# Patient Record
Sex: FEMALE | Race: WHITE | Employment: FULL TIME | URBAN - METROPOLITAN AREA
[De-identification: names, ages, dates, MRNs, and addresses within clinical notes are randomized per-mention and may not be internally consistent; named-entity substitution may affect disease eponyms.]

---

## 2020-01-10 ENCOUNTER — OCCMED (OUTPATIENT)
Dept: URGENT CARE | Facility: CLINIC | Age: 35
End: 2020-01-10

## 2020-01-10 DIAGNOSIS — Z02.1 PHYSICAL EXAM, PRE-EMPLOYMENT: Primary | ICD-10-CM

## 2020-01-10 PROCEDURE — 86480 TB TEST CELL IMMUN MEASURE: CPT | Performed by: PHYSICIAN ASSISTANT

## 2020-01-10 PROCEDURE — 86765 RUBEOLA ANTIBODY: CPT | Performed by: PHYSICIAN ASSISTANT

## 2020-01-10 PROCEDURE — 86735 MUMPS ANTIBODY: CPT | Performed by: PHYSICIAN ASSISTANT

## 2020-01-10 PROCEDURE — 86762 RUBELLA ANTIBODY: CPT | Performed by: PHYSICIAN ASSISTANT

## 2020-01-11 LAB — RUBV IGG SERPL IA-ACNC: 158.9 IU/ML

## 2020-01-13 LAB
GAMMA INTERFERON BACKGROUND BLD IA-ACNC: 0.04 IU/ML
M TB IFN-G BLD-IMP: NEGATIVE
M TB IFN-G CD4+ BCKGRND COR BLD-ACNC: 0 IU/ML
M TB IFN-G CD4+ BCKGRND COR BLD-ACNC: 0 IU/ML
MITOGEN IGNF BCKGRD COR BLD-ACNC: >10 IU/ML

## 2020-01-14 LAB
MEV IGG SER QL: NORMAL
MUV IGG SER QL: NORMAL

## 2020-04-07 ENCOUNTER — NURSE TRIAGE (OUTPATIENT)
Dept: OTHER | Facility: OTHER | Age: 35
End: 2020-04-07

## 2020-04-07 DIAGNOSIS — Z20.828 SARS-ASSOCIATED CORONAVIRUS EXPOSURE: Primary | ICD-10-CM

## 2020-04-07 DIAGNOSIS — Z20.828 SARS-ASSOCIATED CORONAVIRUS EXPOSURE: ICD-10-CM

## 2020-04-07 PROCEDURE — 87635 SARS-COV-2 COVID-19 AMP PRB: CPT

## 2020-04-08 ENCOUNTER — OFFICE VISIT (OUTPATIENT)
Dept: URGENT CARE | Facility: CLINIC | Age: 35
End: 2020-04-08

## 2020-04-08 VITALS
DIASTOLIC BLOOD PRESSURE: 90 MMHG | WEIGHT: 205.2 LBS | BODY MASS INDEX: 34.19 KG/M2 | RESPIRATION RATE: 16 BRPM | OXYGEN SATURATION: 97 % | HEART RATE: 113 BPM | HEIGHT: 65 IN | SYSTOLIC BLOOD PRESSURE: 138 MMHG | TEMPERATURE: 97.8 F

## 2020-04-08 DIAGNOSIS — R68.89 FLU-LIKE SYMPTOMS: Primary | ICD-10-CM

## 2020-04-08 LAB
INPATIENT: NORMAL
SARS-COV-2 RNA SPEC QL NAA+PROBE: NOT DETECTED

## 2020-04-08 PROCEDURE — 87631 RESP VIRUS 3-5 TARGETS: CPT | Performed by: PHYSICIAN ASSISTANT

## 2020-04-08 PROCEDURE — G0382 LEV 3 HOSP TYPE B ED VISIT: HCPCS | Performed by: PHYSICIAN ASSISTANT

## 2020-04-09 ENCOUNTER — TELEPHONE (OUTPATIENT)
Dept: URGENT CARE | Facility: CLINIC | Age: 35
End: 2020-04-09

## 2020-04-09 LAB
FLUAV RNA NPH QL NAA+PROBE: NORMAL
FLUBV RNA NPH QL NAA+PROBE: NORMAL
RSV RNA NPH QL NAA+PROBE: NORMAL

## 2021-04-30 ENCOUNTER — IMMUNIZATIONS (OUTPATIENT)
Dept: FAMILY MEDICINE CLINIC | Facility: HOSPITAL | Age: 36
End: 2021-04-30

## 2021-04-30 DIAGNOSIS — Z23 ENCOUNTER FOR IMMUNIZATION: Primary | ICD-10-CM

## 2021-04-30 PROCEDURE — 91301 SARS-COV-2 / COVID-19 MRNA VACCINE (MODERNA) 100 MCG: CPT

## 2021-04-30 PROCEDURE — 0011A SARS-COV-2 / COVID-19 MRNA VACCINE (MODERNA) 100 MCG: CPT

## 2021-05-26 ENCOUNTER — IMMUNIZATIONS (OUTPATIENT)
Dept: FAMILY MEDICINE CLINIC | Facility: HOSPITAL | Age: 36
End: 2021-05-26

## 2021-05-26 DIAGNOSIS — Z23 ENCOUNTER FOR IMMUNIZATION: Primary | ICD-10-CM

## 2021-05-26 PROCEDURE — 0012A SARS-COV-2 / COVID-19 MRNA VACCINE (MODERNA) 100 MCG: CPT

## 2021-05-26 PROCEDURE — 91301 SARS-COV-2 / COVID-19 MRNA VACCINE (MODERNA) 100 MCG: CPT

## 2021-10-29 ENCOUNTER — NURSE TRIAGE (OUTPATIENT)
Dept: OTHER | Facility: OTHER | Age: 36
End: 2021-10-29

## 2021-10-29 DIAGNOSIS — Z20.822 SUSPECTED SEVERE ACUTE RESPIRATORY SYNDROME CORONAVIRUS 2 (SARS-COV-2) INFECTION: Primary | ICD-10-CM

## 2021-10-29 PROCEDURE — U0005 INFEC AGEN DETEC AMPLI PROBE: HCPCS | Performed by: FAMILY MEDICINE

## 2021-10-29 PROCEDURE — U0003 INFECTIOUS AGENT DETECTION BY NUCLEIC ACID (DNA OR RNA); SEVERE ACUTE RESPIRATORY SYNDROME CORONAVIRUS 2 (SARS-COV-2) (CORONAVIRUS DISEASE [COVID-19]), AMPLIFIED PROBE TECHNIQUE, MAKING USE OF HIGH THROUGHPUT TECHNOLOGIES AS DESCRIBED BY CMS-2020-01-R: HCPCS | Performed by: FAMILY MEDICINE

## 2021-10-30 LAB — SARS-COV-2 RNA RESP QL NAA+PROBE: NEGATIVE

## 2021-11-02 ENCOUNTER — TELEPHONE (OUTPATIENT)
Dept: INTERNAL MEDICINE CLINIC | Facility: CLINIC | Age: 36
End: 2021-11-02

## 2023-05-16 NOTE — PROGRESS NOTES
Depression Screening and Follow-up Plan: Patient was screened for depression during today's encounter  They screened negative with a PHQ-2 score of 0  Assessment/Plan:    No problem-specific Assessment & Plan notes found for this encounter  Diagnoses and all orders for this visit:    Annual physical exam    Migraine without aura and with status migrainosus, not intractable    Gastroesophageal reflux disease with esophagitis without hemorrhage    Screening for cervical cancer  -     Ambulatory referral to Obstetrics / Gynecology; Future    Screening for diabetes mellitus  -     Hemoglobin A1C; Future    Screening for cardiovascular condition  -     Lipid panel; Future  -     Comprehensive metabolic panel; Future  -     Lipid panel; Future    Need for hepatitis C screening test  -     Hepatitis C antibody; Future    Irritable bowel syndrome with diarrhea  -     Ambulatory Referral to Gastroenterology; Future    Anxiety  -     CBC and Platelet; Future  -     TSH, 3rd generation with Free T4 reflex; Future  -     Ambulatory Referral to [facility]; Future  -     vortioxetine (TRINTELLIX) 10 MG tablet; Take 1 tablet (10 mg total) by mouth daily    Carpal tunnel syndrome of right wrist  -     EMG 2 Limb Upper Extremity; Future    Other orders  -     melatonin 3 mg; Take 3 mg by mouth daily at bedtime  -     diphenhydrAMINE HCl, Sleep, 50 MG CAPS; Take 50 mg by mouth          Subjective:      Patient ID: Marie Acharya is a 40 y o  female      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  Mammogram  2 x  Lumps=ok  Mom breast cancer 52's  No genetic markers    G3= 15 yo     Son 8 and 8  Single mom    cts  R>l years    ibs    Anxiety--ativan 5 years ago  lexapro and effexor ill tollerated  [place]  Passion flower   Hemp  vallerian root  No colon ca hx      The following portions of the patient's history were reviewed and updated as appropriate: allergies, current medications, past family history, past medical history, past social history, past surgical history, and problem list     Review of Systems   Constitutional: Negative for activity change, appetite change, chills, diaphoresis, fatigue and fever  HENT: Negative for congestion, facial swelling, hearing loss, mouth sores, rhinorrhea, sore throat, trouble swallowing and voice change  Eyes: Negative for photophobia and pain  Respiratory: Negative for apnea, cough, chest tightness, shortness of breath and stridor  Cardiovascular: Negative for chest pain, palpitations and leg swelling  Gastrointestinal: Negative for abdominal distention, abdominal pain, blood in stool and constipation  Endocrine: Negative for cold intolerance and heat intolerance  Genitourinary: Negative for difficulty urinating, dysuria, flank pain, genital sores, hematuria and urgency  Musculoskeletal: Negative for arthralgias, back pain, gait problem, joint swelling and myalgias  Skin: Negative for rash and wound  Allergic/Immunologic: Negative for environmental allergies, food allergies and immunocompromised state  Neurological: Negative for dizziness, tremors, seizures, syncope, facial asymmetry, speech difficulty, weakness, light-headedness, numbness and headaches  Hematological: Negative for adenopathy  Does not bruise/bleed easily  Psychiatric/Behavioral: Negative for agitation, behavioral problems, hallucinations, self-injury, sleep disturbance and suicidal ideas  Objective:      /70   Pulse 74   Temp (!) 97 4 °F (36 3 °C)   Resp 12   SpO2 98%          Physical Exam  Vitals and nursing note reviewed  Constitutional:       General: She is not in acute distress  Appearance: Normal appearance  She is not ill-appearing  HENT:      Head: Normocephalic  Right Ear: External ear normal  There is no impacted cerumen  Left Ear: External ear normal  There is no impacted cerumen  Nose: No congestion or rhinorrhea        Mouth/Throat: Pharynx: No posterior oropharyngeal erythema  Eyes:      General: No scleral icterus  Right eye: No discharge  Left eye: No discharge  Neck:      Vascular: No carotid bruit  Cardiovascular:      Rate and Rhythm: Normal rate and regular rhythm  Heart sounds: Normal heart sounds  No murmur heard  No friction rub  No gallop  Pulmonary:      Breath sounds: No wheezing or rhonchi  Abdominal:      General: There is no distension  Tenderness: There is no abdominal tenderness  There is no guarding  Musculoskeletal:         General: No swelling  Cervical back: No rigidity  Right lower leg: No edema  Left lower leg: No edema  Lymphadenopathy:      Cervical: No cervical adenopathy  Skin:     Coloration: Skin is not jaundiced  Neurological:      Mental Status: She is alert  Cranial Nerves: No cranial nerve deficit  Motor: No weakness        Coordination: Coordination normal    Psychiatric:         Mood and Affect: Mood normal

## 2023-05-17 ENCOUNTER — OFFICE VISIT (OUTPATIENT)
Dept: INTERNAL MEDICINE CLINIC | Facility: CLINIC | Age: 38
End: 2023-05-17

## 2023-05-17 VITALS
HEART RATE: 74 BPM | TEMPERATURE: 97.4 F | RESPIRATION RATE: 12 BRPM | SYSTOLIC BLOOD PRESSURE: 130 MMHG | DIASTOLIC BLOOD PRESSURE: 70 MMHG | OXYGEN SATURATION: 98 %

## 2023-05-17 DIAGNOSIS — Z13.6 SCREENING FOR CARDIOVASCULAR CONDITION: ICD-10-CM

## 2023-05-17 DIAGNOSIS — G56.01 CARPAL TUNNEL SYNDROME OF RIGHT WRIST: ICD-10-CM

## 2023-05-17 DIAGNOSIS — Z12.4 SCREENING FOR CERVICAL CANCER: ICD-10-CM

## 2023-05-17 DIAGNOSIS — Z11.59 NEED FOR HEPATITIS C SCREENING TEST: ICD-10-CM

## 2023-05-17 DIAGNOSIS — G43.001 MIGRAINE WITHOUT AURA AND WITH STATUS MIGRAINOSUS, NOT INTRACTABLE: ICD-10-CM

## 2023-05-17 DIAGNOSIS — F41.9 ANXIETY: ICD-10-CM

## 2023-05-17 DIAGNOSIS — Z00.00 ANNUAL PHYSICAL EXAM: Primary | ICD-10-CM

## 2023-05-17 DIAGNOSIS — K58.0 IRRITABLE BOWEL SYNDROME WITH DIARRHEA: ICD-10-CM

## 2023-05-17 DIAGNOSIS — K21.00 GASTROESOPHAGEAL REFLUX DISEASE WITH ESOPHAGITIS WITHOUT HEMORRHAGE: ICD-10-CM

## 2023-05-17 DIAGNOSIS — Z13.1 SCREENING FOR DIABETES MELLITUS: ICD-10-CM

## 2023-05-17 RX ORDER — LANOLIN ALCOHOL/MO/W.PET/CERES
3 CREAM (GRAM) TOPICAL
COMMUNITY

## 2023-06-05 ENCOUNTER — OFFICE VISIT (OUTPATIENT)
Dept: OBGYN CLINIC | Facility: CLINIC | Age: 38
End: 2023-06-05
Payer: COMMERCIAL

## 2023-06-05 VITALS
WEIGHT: 190.8 LBS | SYSTOLIC BLOOD PRESSURE: 120 MMHG | HEIGHT: 64 IN | BODY MASS INDEX: 32.58 KG/M2 | DIASTOLIC BLOOD PRESSURE: 84 MMHG

## 2023-06-05 DIAGNOSIS — Z12.4 SCREENING FOR CERVICAL CANCER: ICD-10-CM

## 2023-06-05 DIAGNOSIS — Z01.419 ENCNTR FOR GYN EXAM (GENERAL) (ROUTINE) W/O ABN FINDINGS: Primary | ICD-10-CM

## 2023-06-05 PROCEDURE — G0476 HPV COMBO ASSAY CA SCREEN: HCPCS | Performed by: OBSTETRICS & GYNECOLOGY

## 2023-06-05 PROCEDURE — G0145 SCR C/V CYTO,THINLAYER,RESCR: HCPCS | Performed by: OBSTETRICS & GYNECOLOGY

## 2023-06-05 PROCEDURE — S0610 ANNUAL GYNECOLOGICAL EXAMINA: HCPCS | Performed by: OBSTETRICS & GYNECOLOGY

## 2023-06-05 NOTE — PROGRESS NOTES
Annual Wellness Visit  [address], [address], [facility], [address] [facility]    ASSESSMENT/PLAN: Trang Damian is a 40 y o  [id number] who presents for annual gynecologic exam     Encounter for routine gynecologic examination  - Routine well woman exam completed today  - Cervical Cancer Screening: Current ASCCP Guidelines reviewed  Last Pap: Not on file  Next Pap Due: today, routine   - HPV Vaccination status: Immunization series complete  - STI screening offered including HIV testing: offered, pt declined  - Contraceptive counseling discussed  Current contraception: abstinence  - The following were reviewed in today's visit: breast self exam    Additional problems addressed during this visit:  1  Encntr for gyn exam (general) (routine) w/o abn findings  -     Liquid-based pap, screening    2  Screening for cervical cancer  -     Ambulatory referral to Obstetrics / Gynecology  -     Liquid-based pap, screening        Next visit: 1 yr    CC:  Annual Gynecologic Examination    HPI: Trang Damian is a 40 y o  [id number] who presents for annual gynecologic examination  New patient presents for Gyn exam   Previous Gyn exam 8 years ago  Patient with IBS, colonoscopy scheduled with G I       Gyn History  Patient's last menstrual period was 2023 (exact date)  Last Pap: Not on file was normal    She  reports that she is not currently sexually active         OB History  [id number]    Past Medical History:  No date: GERD (gastroesophageal reflux disease)  No date: Migraines     Past Surgical History:  No date:  SECTION  No date: SINUS SURGERY  No date: TONSILLECTOMY     Family History   Problem Relation Age of Onset   • Breast cancer Mother [address] [place]   • Diabetes Father    • Hypertension Father         Social History     Tobacco Use   • Smoking status: Never   • Smokeless tobacco: Never   Vaping Use   • Vaping Use: Never used   Substance Use Topics   • Alcohol use: Yes     Comment: social "  • Drug use: Never          Current Outpatient Medications:   •  diphenhydrAMINE HCl, Sleep, 50 MG CAPS, Take 50 mg by mouth, Disp: , Rfl:   •  melatonin 3 mg, Take 3 mg by mouth daily at bedtime, Disp: , Rfl:   •  PARoxetine (PAXIL-CR) 12 5 mg 24 hr tablet, Take 1 tablet (12 5 mg total) by mouth every morning, Disp: 30 tablet, Rfl: 2    She is allergic to dust mite extract       ROS negative except as noted in HPI    Objective:  /84 (BP Location: Right arm, Patient Position: Sitting, Cuff Size: Standard)   Ht 5' 4\" (1 626 m)   Wt 86 5 kg (190 lb 12 8 oz)   LMP 05/22/2023 (Exact Date)   BMI 32 75 kg/m²      Physical Exam  Vitals and nursing note reviewed  HENT:      Head: Normocephalic  Chest:   Breasts:     Breasts are symmetrical       Right: Normal  No bleeding, mass, nipple discharge, skin change or tenderness  Left: Normal  No bleeding, mass, nipple discharge, skin change or tenderness  Abdominal:      General: There is no distension  Palpations: Abdomen is soft  There is no mass  Tenderness: There is no abdominal tenderness  There is no rebound  Genitourinary:     General: Normal vulva  Exam position: Lithotomy position  Labia:         Right: No rash, tenderness or lesion  Left: No rash, tenderness or lesion  Urethra: No urethral pain or urethral lesion  Vagina: Normal  No vaginal discharge  Cervix: No discharge, friability, lesion or erythema  Uterus: Normal        Adnexa: Right adnexa normal and left adnexa normal       Rectum: No external hemorrhoid  Musculoskeletal:      Right lower leg: No edema  Left lower leg: No edema  Lymphadenopathy:      Upper Body:      Right upper body: No axillary or pectoral adenopathy  Left upper body: No axillary or pectoral adenopathy  Skin:     General: Skin is warm  Neurological:      Mental Status: She is alert and oriented to person, place, and time     Psychiatric:         Mood and " Affect: Mood normal          Behavior: Behavior normal          Thought Content:  Thought content normal

## 2023-06-07 LAB
HPV HR 12 DNA CVX QL NAA+PROBE: NEGATIVE
HPV16 DNA CVX QL NAA+PROBE: NEGATIVE
HPV18 DNA CVX QL NAA+PROBE: NEGATIVE

## 2023-06-12 LAB
LAB AP GYN PRIMARY INTERPRETATION: NORMAL
Lab: NORMAL

## 2023-07-16 PROBLEM — Z11.59 NEED FOR HEPATITIS C SCREENING TEST: Status: RESOLVED | Noted: 2023-05-17 | Resolved: 2023-07-16

## 2023-07-16 PROBLEM — Z12.4 SCREENING FOR CERVICAL CANCER: Status: RESOLVED | Noted: 2023-05-17 | Resolved: 2023-07-16

## 2023-07-16 PROBLEM — Z13.6 SCREENING FOR CARDIOVASCULAR CONDITION: Status: RESOLVED | Noted: 2023-05-17 | Resolved: 2023-07-16

## 2023-07-17 ENCOUNTER — SOCIAL WORK (OUTPATIENT)
Dept: BEHAVIORAL/MENTAL HEALTH CLINIC | Facility: CLINIC | Age: 38
End: 2023-07-17
Payer: COMMERCIAL

## 2023-07-17 DIAGNOSIS — F42.2 MIXED OBSESSIONAL THOUGHTS AND ACTS: ICD-10-CM

## 2023-07-17 DIAGNOSIS — F43.12 POST-TRAUMATIC STRESS DISORDER, CHRONIC: Primary | ICD-10-CM

## 2023-07-17 PROCEDURE — 90791 PSYCH DIAGNOSTIC EVALUATION: CPT | Performed by: SOCIAL WORKER
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
## 2023-07-31 ENCOUNTER — OFFICE VISIT (OUTPATIENT)
Dept: INTERNAL MEDICINE CLINIC | Facility: CLINIC | Age: 38
End: 2023-07-31
Payer: COMMERCIAL

## 2023-07-31 VITALS
BODY MASS INDEX: 32.78 KG/M2 | HEIGHT: 64 IN | RESPIRATION RATE: 12 BRPM | TEMPERATURE: 97.8 F | HEART RATE: 74 BPM | DIASTOLIC BLOOD PRESSURE: 70 MMHG | OXYGEN SATURATION: 97 % | SYSTOLIC BLOOD PRESSURE: 130 MMHG | WEIGHT: 192 LBS

## 2023-07-31 DIAGNOSIS — F41.9 ANXIETY: Primary | ICD-10-CM

## 2023-07-31 PROCEDURE — 99213 OFFICE O/P EST LOW 20 MIN: CPT | Performed by: INTERNAL MEDICINE

## 2023-07-31 RX ORDER — PAROXETINE HYDROCHLORIDE HEMIHYDRATE 25 MG/1
25 TABLET, FILM COATED, EXTENDED RELEASE ORAL EVERY MORNING
Qty: 30 TABLET | Refills: 4 | Status: SHIPPED | OUTPATIENT
Start: 2023-07-31 | End: 2023-08-30

## 2023-07-31 NOTE — PROGRESS NOTES
BMI Counseling: There is no height or weight on file to calculate BMI. The BMI is above normal. Nutrition recommendations include decreasing portion sizes. Exercise recommendations include exercising 3-5 times per week. Patient referred to PCP. Rationale for BMI follow-up plan is due to patient being overweight or obese. Assessment/Plan:    No problem-specific Assessment & Plan notes found for this encounter. Diagnoses and all orders for this visit:    Anxiety  -     PARoxetine (PAXIL-CR) 25 mg 24 hr tablet; Take 1 tablet (25 mg total) by mouth every morning          Subjective:      Patient ID: Gwen Calzada is a 40 y.o. female. Labs 5/2023=pending-rerecommend  Gyn pap 6/2023 7/17/23=behavior therapist=works well  paxil helps  Thinks can increas  Also pmdd  Labs and gi help      The following portions of the patient's history were reviewed and updated as appropriate: allergies, current medications, past family history, past medical history, past social history, past surgical history, and problem list.    Review of Systems   Constitutional: Negative for activity change, appetite change, chills, diaphoresis, fatigue and fever. HENT: Negative for congestion, facial swelling, hearing loss, mouth sores, rhinorrhea, sore throat, trouble swallowing and voice change. Eyes: Negative for photophobia and pain. Respiratory: Negative for apnea, cough, chest tightness, shortness of breath and stridor. Cardiovascular: Negative for chest pain, palpitations and leg swelling. Gastrointestinal: Negative for abdominal distention, abdominal pain, blood in stool and constipation. Endocrine: Negative for cold intolerance and heat intolerance. Genitourinary: Negative for difficulty urinating, dysuria, flank pain, genital sores, hematuria and urgency. Musculoskeletal: Negative for arthralgias, back pain, gait problem, joint swelling and myalgias. Skin: Negative for rash and wound. Allergic/Immunologic: Negative for environmental allergies, food allergies and immunocompromised state. Neurological: Negative for dizziness, tremors, seizures, syncope, facial asymmetry, speech difficulty, weakness, light-headedness, numbness and headaches. Hematological: Negative for adenopathy. Does not bruise/bleed easily. Psychiatric/Behavioral: Negative for agitation, behavioral problems, hallucinations, self-injury, sleep disturbance and suicidal ideas. Objective:      /70   Pulse 74   Temp 97.8 °F (36.6 °C)   Resp 12   Ht 5' 4" (1.626 m)   Wt 87.1 kg (192 lb)   SpO2 97%   BMI 32.96 kg/m²          Physical Exam  Vitals and nursing note reviewed. Constitutional:       General: She is not in acute distress. Appearance: Normal appearance. She is not ill-appearing. HENT:      Head: Normocephalic. Right Ear: External ear normal. There is no impacted cerumen. Left Ear: External ear normal. There is no impacted cerumen. Nose: No congestion or rhinorrhea. Mouth/Throat:      Pharynx: No posterior oropharyngeal erythema. Eyes:      General: No scleral icterus. Right eye: No discharge. Left eye: No discharge. Neck:      Vascular: No carotid bruit. Cardiovascular:      Rate and Rhythm: Normal rate and regular rhythm. Heart sounds: Normal heart sounds. No murmur heard. No friction rub. No gallop. Pulmonary:      Breath sounds: No wheezing or rhonchi. Abdominal:      General: There is no distension. Tenderness: There is no abdominal tenderness. There is no guarding. Musculoskeletal:         General: No swelling. Cervical back: No rigidity. Right lower leg: No edema. Left lower leg: No edema. Lymphadenopathy:      Cervical: No cervical adenopathy. Skin:     Coloration: Skin is not jaundiced. Neurological:      Mental Status: She is alert. Cranial Nerves: No cranial nerve deficit.       Motor: No weakness.       Coordination: Coordination normal.   Psychiatric:         Mood and Affect: Mood normal.

## 2023-10-02 ENCOUNTER — TELEPHONE (OUTPATIENT)
Dept: BEHAVIORAL/MENTAL HEALTH CLINIC | Facility: CLINIC | Age: 38
End: 2023-10-02

## 2023-10-17 ENCOUNTER — TELEPHONE (OUTPATIENT)
Dept: BEHAVIORAL/MENTAL HEALTH CLINIC | Facility: CLINIC | Age: 38
End: 2023-10-17

## 2023-10-20 ENCOUNTER — TELEPHONE (OUTPATIENT)
Dept: BEHAVIORAL/MENTAL HEALTH CLINIC | Facility: CLINIC | Age: 38
End: 2023-10-20

## 2023-11-14 ENCOUNTER — TELEMEDICINE (OUTPATIENT)
Dept: BEHAVIORAL/MENTAL HEALTH CLINIC | Facility: CLINIC | Age: 38
End: 2023-11-14
Payer: COMMERCIAL

## 2023-11-14 DIAGNOSIS — F43.12 POST-TRAUMATIC STRESS DISORDER, CHRONIC: Primary | ICD-10-CM

## 2023-11-14 DIAGNOSIS — F42.2 MIXED OBSESSIONAL THOUGHTS AND ACTS: ICD-10-CM

## 2023-11-14 PROCEDURE — 90837 PSYTX W PT 60 MINUTES: CPT | Performed by: SOCIAL WORKER

## 2023-11-14 NOTE — PSYCH
Virtual Regular Visit    Verification of patient location:    Patient is located at Home in the following state in which I hold an active license [place]      Assessment/Plan:    Problem List Items Addressed This Visit    None  Visit Diagnoses       Post-traumatic stress disorder, chronic    -  Primary    Mixed obsessional thoughts and acts                Goals addressed in session: Goal 1 and Goal 2          Reason for visit is   Chief Complaint   Patient presents with    Virtual Regular Visit          Encounter provider Britta Pineda    Provider located at [address] [place] [address]  [phone]      Recent Visits  No visits were found meeting these conditions. Showing recent visits within past 7 days and meeting all other requirements  Today's Visits  Date Type Provider Dept   [address] Therapist [facility]   Showing today's visits and meeting all other requirements  Future Appointments  No visits were found meeting these conditions. Showing future appointments within next 150 days and meeting all other requirements       The patient was identified by name and date of birth. Marjorie Finley was informed that this is a telemedicine visit and [facility] She agrees to proceed. .  My office door was closed. No one else was in the room. She acknowledged consent and understanding of privacy and security of the video platform. The patient has agreed to participate and understands they can discontinue the visit at any time. Patient is aware this is a billable service. Subjective  Marjorie Finley is a 40 y.o. female  .       HPI     Past Medical History:   Diagnosis Date    GERD (gastroesophageal reflux disease)     Migraines        Past Surgical History:   Procedure Laterality Date     SECTION      SINUS SURGERY      TONSILLECTOMY         Current Outpatient Medications   Medication Sig Dispense Refill    diphenhydrAMINE HCl, Sleep, 50 MG CAPS Take 50 mg by mouth      melatonin 3 mg Take 3 mg by mouth daily at bedtime      PARoxetine (PAXIL-CR) 25 mg 24 hr tablet Take 1 tablet (25 mg total) by mouth every morning 30 tablet 4     No current facility-administered medications for this visit. Allergies   Allergen Reactions    Dust Mite Extract Hives       Review of Systems    Video Exam    There were no vitals filed for this visit. Physical Exam     Behavioral Health Psychotherapy Progress Note    Psychotherapy Provided: Individual Psychotherapy     1. Post-traumatic stress disorder, chronic        2. Mixed obsessional thoughts and acts            Goals addressed in session: Goal 1 and Goal 2     DATA: Therapist met with Opal Foreman. Therapist and client discussed recent events that had caused increased distress and anxiety. She shared these stressors and worries that she has related to work and her current plan at work to rectify these stressors. Therapist discussed treatment planning and what they could do in the coming weeks to assist in alleviating symptoms. During this session, this clinician used the following therapeutic modalities: Client-centered Therapy, Cognitive Behavioral Therapy, Cognitive Processing Therapy, Mindfulness-based Strategies, and Supportive Psychotherapy    Substance Abuse was not addressed during this session. If the client is diagnosed with a co-occurring substance use disorder, please indicate any changes in the frequency or amount of use: na. Stage of change for addressing substance use diagnoses: No substance use/Not applicable    ASSESSMENT:  Claudeen Decree presents with a Euthymic/ normal mood. her affect is Normal range and intensity, which is congruent, with her mood and the content of the session. The client has made progress on their goals.      Claudeen Decree presents with a minimal risk of suicide, minimal risk of self-harm, and minimal risk of harm to others. For any risk assessment that surpasses a "low" rating, a safety plan must be developed. A safety plan was indicated: no  If yes, describe in detail na    PLAN: Between sessions, Jesi Salguero will utilize learned skills. At the next session, the therapist will use Supportive Psychotherapy to address anxiety. Behavioral Health Treatment Plan and Discharge Planning: Jesi Salguero is aware of and agrees to continue to work on their treatment plan. They have identified and are working toward their discharge goals.  yes    Visit start and stop times:    11/14/23  Start Time: 0800  Stop Time: 0900  Total Visit Time: 60 minutes

## 2023-11-14 NOTE — BH CRISIS PLAN
Client Name: Marla Soler       Client YOB: 1985  : 1985    Treatment Team (include name and contact information):     Psychotherapist: Monik Mtz    Psychiatrist: Dr. Shakeel Madden of information completed: no    " na   Release of information completed: no    Other (Specify Role): na    Release of information completed: no    Other (Specify Role): na   Release of information completed: no    Healthcare Provider  Itz Weaver DO  [address] [facility]  [phone]    Type of Plan   * Child plans (children 15 yo and younger) must be completed and signed by the child's legal guardian   * Plans for all individuals 15 yo and above must be signed by the client. 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, good nurse, open"  The stressors and triggers that may put me at risk are:  chronic anxiety, divorce, and job stress    Coping skills I can use to keep myself calm and safe: Take a shower, Listen to music, Physical activity, [place]/meditate, Journal, and Increased contact with professional supports    Coping skills/supports I can use to maintain abstinence from substance use:   Not Applicable    The people that provide me with help and support: (Include name, contact, and how they can help)   Support person #1: [address]    * Phone number: in cell phone    * How can they help me? Explore her feelings   Support person #2:Corrie    * Phone number: in cell phone    * How can they help me?  Schedule an appointment       In the past, the following has helped me in times of crisis:    Being in a quiet space, Being with other people, Taking medications, Talking to a professional on the telephone, Calling a friend, Taking a walk or exercising, Breathing exercises (or other mindfulness-based activities), Praying or meditating, Using de-escalation tools that I have learned, Listening to music, and Watching television or a movie      If it is an emergency and you need immediate help, call 9-1-1    If there is a possibility of danger to yourself or others, call the following crisis hotline resources:     Adult Crisis Numbers  Suicide Prevention Hotline - Dial 9-8-8  [facility]: [address] [facility] [address] [facility] [address]: [address] [facility] [address]: [address]: [phone]  [address] [place] [address] Street: [address]: [address]: [address]: [phone] (daytime). [phone] (after hours, weekends, holidays)     Child/Adolescent Crisis Numbers   [facility]: [address]: [phone]   [place]: [phone]   [address] [place] [address] Street: [phone]    Please note: Some [facility] do not have a separate number for Child/Adolescent specific crisis. If your county is not listed under Child/Adolescent, please call the adult number for your county     National Talk to Text Line   All Ages - 911-426    In the event your feelings become unmanageable, and you cannot reach your support system, you will call 911 immediately or go to the nearest hospital emergency room.

## 2023-11-14 NOTE — BH TREATMENT PLAN
Nanci  1985     Date of Initial Psychotherapy Assessment: 11/14/23   Date of Current Treatment Plan: 11/14/23  Treatment Plan Target Date: 5/14/24  Treatment Plan Expiration Date: 5/14/24    Diagnosis:   1. Post-traumatic stress disorder, chronic        2. Mixed obsessional thoughts and acts            Area(s) of Need: support, understanding, kindness    Long Term Goal 1 (in the client's own words): Reduce intrusive thoughts (anxiety)    Stage of Change: Preparation    Target Date for completion: 5/24     Anticipated therapeutic modalities: CBT, Supportive Counseling, Mindfulness, Medication Management, [place] Setting, Strengths Based     People identified to complete this goal: Dante Edmonds, Psychiatry      Objective 1: (identify the means of measuring success in meeting the objective): I will be able to learn at least 5 new coping skills to reduce symptoms of anxiety by at least 50%. Objective 2: (identify the means of measuring success in meeting the objective): I will comply with medication management as agreed upon at least 75% of the time following meeting with my psychiatrist.       Tran Sharpe Term Goal 2 (in the client's own words): Establish and maintain healthy boundaries and improve self care    Stage of Change: Preparation    Target Date for completion: 5/24     Anticipated therapeutic modalities: CBT, Communication, Medication Management, Supportive Counseling      People identified to complete this goal: Dante Edmonds, Psychiatry      Objective 1: (identify the means of measuring success in meeting the objective): I will be able to set and implement at least 5 boundaries with others, as I see necessary, and maintain them at least 50% of the time. Objective 2: (identify the means of measuring success in meeting the objective):  I will be able to engage in at least 3 activities that promote self care each month, as self reported. I am currently under the care of a [facility] psychiatric provider: yes    My [facility] psychiatric provider is: Dr. Kenny Garrison    I am currently taking psychiatric medications: Yes, as prescribed    I feel that I will be ready for discharge from mental health care when I reach the following (measurable goal/objective): I am able to feel confident and stable within my own environment at home and at work, at least 80% of the time, and am able to seek support from appropritate resources when I am feeling stressed and in need of support at least 80% of the time. For children and adults who have a legal guardian:   Has there been any change to custody orders and/or guardianship status? No. If yes, attach updated documentation. I have created my Crisis Plan and have been offered a copy of this plan    [facility] [facility]: Diagnosis and Treatment Plan explained to [employer] acknowledges an understanding of their diagnosis. Marla Soler agrees to this treatment plan. I have been offered a copy of this Treatment Plan. Yes    Marla Soler, 1985, actively participated in the creation of this treatment plan during a virtual session, using the [address] [place] [address]. Marla Soler  provided verbal consent on 11/14/2023 at 8:00 AM. The treatment plan was transcribed into the [address] [place] [address] Real Record at a later time.

## 2023-11-22 ENCOUNTER — TELEMEDICINE (OUTPATIENT)
Dept: BEHAVIORAL/MENTAL HEALTH CLINIC | Facility: CLINIC | Age: 38
End: 2023-11-22
Payer: COMMERCIAL

## 2023-11-22 DIAGNOSIS — F43.12 POST-TRAUMATIC STRESS DISORDER, CHRONIC: Primary | ICD-10-CM

## 2023-11-22 DIAGNOSIS — F42.2 MIXED OBSESSIONAL THOUGHTS AND ACTS: ICD-10-CM

## 2023-11-22 PROCEDURE — 90834 PSYTX W PT 45 MINUTES: CPT | Performed by: SOCIAL WORKER

## 2023-11-22 NOTE — PSYCH
Virtual Regular Visit    Verification of patient location:    Patient is located at Home in the following state in which I hold an active license [place]      Assessment/Plan:    Problem List Items Addressed This Visit    None  Visit Diagnoses       Post-traumatic stress disorder, chronic    -  Primary    Mixed obsessional thoughts and acts                Goals addressed in session: Goal 1 and Goal 2          Reason for visit is   Chief Complaint   Patient presents with    Virtual Regular Visit          Encounter provider Monik Mtz    Provider located at [address] [place] [address]  [phone]      Recent Visits  No visits were found meeting these conditions. Showing recent visits within past 7 days and meeting all other requirements  Today's Visits  Date Type Provider Dept   [address] Therapist [facility]   Showing today's visits and meeting all other requirements  Future Appointments  No visits were found meeting these conditions. Showing future appointments within next 150 days and meeting all other requirements       The patient was identified by name and date of birth. Marla Soler was informed that this is a telemedicine visit and [facility] She agrees to proceed. .  My office door was closed. No one else was in the room. She acknowledged consent and understanding of privacy and security of the video platform. The patient has agreed to participate and understands they can discontinue the visit at any time. Patient is aware this is a billable service. Subjective  Marla Soler is a 40 y.o. female  .       HPI     Past Medical History:   Diagnosis Date    GERD (gastroesophageal reflux disease)     Migraines        Past Surgical History:   Procedure Laterality Date     SECTION      SINUS SURGERY      TONSILLECTOMY         Current Outpatient Medications   Medication Sig Dispense Refill    diphenhydrAMINE HCl, Sleep, 50 MG CAPS Take 50 mg by mouth      melatonin 3 mg Take 3 mg by mouth daily at bedtime      PARoxetine (PAXIL-CR) 25 mg 24 hr tablet Take 1 tablet (25 mg total) by mouth every morning 30 tablet 4     No current facility-administered medications for this visit. Allergies   Allergen Reactions    Dust Mite Extract Hives       Review of Systems    Video Exam    There were no vitals filed for this visit. Physical Exam     Behavioral Health Psychotherapy Progress Note    Psychotherapy Provided: Individual Psychotherapy     1. Post-traumatic stress disorder, chronic        2. Mixed obsessional thoughts and acts            Goals addressed in session: Goal 1 and Goal 2     DATA: Therapist met with Sol Molina. Therapist and client discussed her ability to implement time for herself. She has been able to implement life changes and shared that things have improved with the use of time devoted to herself. She noted significant change when implementing exercise into her daily routine. Therapist continues to encouraged this to the best of her ability, but not to become angry with herself when she is unable. Therapist and client also discussed alternative methods of focusing on herself and including the children in supporting her in chores in order to alleviate some burden of household responsibility. Therapist will continue to explore these outcomes during upcoming sessions, as ongoing therapy remains necessary at this time. During this session, this clinician used the following therapeutic modalities: Client-centered Therapy, Cognitive Behavioral Therapy, Mindfulness-based Strategies, and Supportive Psychotherapy    Substance Abuse was not addressed during this session.  If the client is diagnosed with a co-occurring substance use disorder, please indicate any changes in the frequency or amount of use: na. Stage of change for addressing substance use diagnoses: No substance use/Not applicable    ASSESSMENT:  Telma Salinas presents with a Euthymic/ normal mood. her affect is Normal range and intensity, which is congruent, with her mood and the content of the session. The client has made progress on their goals. Telma Salinas presents with a minimal risk of suicide, minimal risk of self-harm, and minimal risk of harm to others. For any risk assessment that surpasses a "low" rating, a safety plan must be developed. A safety plan was indicated: no  If yes, describe in detail na    PLAN: Between sessions, Telma Salinas will continue to prioritize some time for herself. At the next session, the therapist will use Supportive Psychotherapy to address anxiety. Behavioral Health Treatment Plan and Discharge Planning: Telma Salinas is aware of and agrees to continue to work on their treatment plan. They have identified and are working toward their discharge goals.  yes    Visit start and stop times:    11/22/23  Start Time: 0901  Stop Time: 0945  Total Visit Time: 44 minutes

## 2023-11-28 ENCOUNTER — TELEMEDICINE (OUTPATIENT)
Dept: BEHAVIORAL/MENTAL HEALTH CLINIC | Facility: CLINIC | Age: 38
End: 2023-11-28
Payer: COMMERCIAL

## 2023-11-28 DIAGNOSIS — F42.2 MIXED OBSESSIONAL THOUGHTS AND ACTS: ICD-10-CM

## 2023-11-28 DIAGNOSIS — F43.12 POST-TRAUMATIC STRESS DISORDER, CHRONIC: Primary | ICD-10-CM

## 2023-11-28 PROCEDURE — 90832 PSYTX W PT 30 MINUTES: CPT | Performed by: SOCIAL WORKER

## 2023-11-28 NOTE — PSYCH
Anesthesia Post Evaluation    Patient: Ciarra Smith    Procedure(s) Performed: Procedure(s) (LRB):  INSERTION, NEUROSTIMULATOR, SPINAL CORD, DORSAL COLUMN (N/A)    Final Anesthesia Type: MAC      Patient location during evaluation: PACU  Patient participation: Yes- Able to Participate  Level of consciousness: awake and alert  Post-procedure vital signs: reviewed and stable  Pain management: adequate  Airway patency: patent    PONV status at discharge: No PONV  Anesthetic complications: no      Cardiovascular status: blood pressure returned to baseline  Respiratory status: unassisted and room air  Hydration status: euvolemic  Follow-up not needed.          Vitals Value Taken Time   /75 04/14/23 1118   Temp 36.7 °C (98 °F) 04/14/23 0944   Pulse 79 04/14/23 1118   Resp 12 04/14/23 1118   SpO2 100 % 04/14/23 1118         Event Time   Out of Recovery 10:04:20         Pain/Jatin Score: No data recorded       Virtual Regular Visit    Verification of patient location:    Patient is located at Home in the following state in which I hold an active license [place]      Assessment/Plan:    Problem List Items Addressed This Visit    None  Visit Diagnoses       Post-traumatic stress disorder, chronic    -  Primary    Mixed obsessional thoughts and acts                Goals addressed in session: Goal 1 and Goal 2          Reason for visit is   Chief Complaint   Patient presents with    Virtual Regular Visit          Encounter provider Karen Oneal    Provider located at [address] [place] [address]  [phone]      Recent Visits  Date Type Provider Dept   11/22/23 [address] Therapist [facility]   Showing recent visits within past 7 days and meeting all other requirements  Today's Visits  Date Type Provider Dept   11/28/23 [address] Therapist [facility]   Showing today's visits and meeting all other requirements  Future Appointments  No visits were found meeting these conditions. Showing future appointments within next 150 days and meeting all other requirements       The patient was identified by name and date of birth. Christian Zacarias was informed that this is a telemedicine visit and [facility] She agrees to proceed. .  My office door was closed. No one else was in the room. She acknowledged consent and understanding of privacy and security of the video platform. The patient has agreed to participate and understands they can discontinue the visit at any time. Patient is aware this is a billable service. Subjective  Christian Zacarias is a 40 y.o. female  .       HPI     Past Medical History:   Diagnosis Date    GERD (gastroesophageal reflux disease)     Migraines        Past Surgical History: Procedure Laterality Date     SECTION      SINUS SURGERY      TONSILLECTOMY         Current Outpatient Medications   Medication Sig Dispense Refill    diphenhydrAMINE HCl, Sleep, 50 MG CAPS Take 50 mg by mouth      melatonin 3 mg Take 3 mg by mouth daily at bedtime      PARoxetine (PAXIL-CR) 25 mg 24 hr tablet Take 1 tablet (25 mg total) by mouth every morning 30 tablet 4     No current facility-administered medications for this visit. Allergies   Allergen Reactions    Dust Mite Extract Hives       Review of Systems    Video Exam    There were no vitals filed for this visit. Physical Exam     Behavioral Health Psychotherapy Progress Note    Psychotherapy Provided: Individual Psychotherapy     1. Post-traumatic stress disorder, chronic        2. Mixed obsessional thoughts and acts            Goals addressed in session: Goal 1 and Goal 2     DATA: Therapist met with Sol Molina. She noted that she felt that her Thanksgiving had gone well with her family. She also noted that she was able to utilize her bike and had been riding multiple miles each day. She shared that this had made her feel wonderful. Therapist discussed her focus on herself and her feelings about returning to work. She noted that she felt that a medication change was necessary, but that she was ready to be back with her peers. Therapist will work with Sol Molina to secure a more appropriate work life balance during the coming weeks. During this session, this clinician used the following therapeutic modalities: Cognitive Behavioral Therapy, Cognitive Processing Therapy, Mindfulness-based Strategies, and Supportive Psychotherapy    Substance Abuse was not addressed during this session.  If the client is diagnosed with a co-occurring substance use disorder, please indicate any changes in the frequency or amount of use: na. Stage of change for addressing substance use diagnoses: No substance use/Not applicable    ASSESSMENT:  Sol Molina Deshaun Thomas presents with a Euthymic/ normal mood. her affect is Normal range and intensity, which is congruent, with her mood and the content of the session. The client has made progress on their goals. Ana Wesley presents with a minimal risk of suicide, minimal risk of self-harm, and minimal risk of harm to others. For any risk assessment that surpasses a "low" rating, a safety plan must be developed. A safety plan was indicated: no  If yes, describe in detail na    PLAN: Between sessions, Ana Wesley will meet with psychiatry. At the next session, the therapist will use Supportive Psychotherapy to address anxiety. Behavioral Health Treatment Plan and Discharge Planning: Ana Wesley is aware of and agrees to continue to work on their treatment plan. They have identified and are working toward their discharge goals.  yes    Visit start and stop times:    11/28/23  Start Time: 0830  Stop Time: 0902  Total Visit Time: 32 minutes

## 2023-11-29 ENCOUNTER — OFFICE VISIT (OUTPATIENT)
Dept: PSYCHIATRY | Facility: CLINIC | Age: 38
End: 2023-11-29
Payer: COMMERCIAL

## 2023-11-29 DIAGNOSIS — F43.10 PTSD (POST-TRAUMATIC STRESS DISORDER): Primary | ICD-10-CM

## 2023-11-29 DIAGNOSIS — F42.2 MIXED OBSESSIONAL THOUGHTS AND ACTS: ICD-10-CM

## 2023-11-29 DIAGNOSIS — F41.1 GENERALIZED ANXIETY DISORDER: ICD-10-CM

## 2023-11-29 PROBLEM — F42.9 OCD (OBSESSIVE COMPULSIVE DISORDER): Status: ACTIVE | Noted: 2023-11-29

## 2023-11-29 PROCEDURE — 90792 PSYCH DIAG EVAL W/MED SRVCS: CPT | Performed by: STUDENT IN AN ORGANIZED HEALTH CARE EDUCATION/TRAINING PROGRAM

## 2023-11-29 RX ORDER — PAROXETINE HYDROCHLORIDE HEMIHYDRATE 37.5 MG/1
37.5 TABLET, FILM COATED, EXTENDED RELEASE ORAL EVERY MORNING
Qty: 90 TABLET | Refills: 1 | Status: SHIPPED | OUTPATIENT
Start: 2023-11-29
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
## 2023-11-29 NOTE — Clinical Note
Sharon Shi,  I'd like to get Fraser testing for this patient but she is at [facility]. Can you assist with this or should I inquire within PF? Thanks in advance for your guidance.

## 2023-12-13 ENCOUNTER — TELEMEDICINE (OUTPATIENT)
Dept: BEHAVIORAL/MENTAL HEALTH CLINIC | Facility: CLINIC | Age: 38
End: 2023-12-13
Payer: COMMERCIAL

## 2023-12-13 DIAGNOSIS — F42.2 MIXED OBSESSIONAL THOUGHTS AND ACTS: ICD-10-CM

## 2023-12-13 DIAGNOSIS — F43.12 POST-TRAUMATIC STRESS DISORDER, CHRONIC: Primary | ICD-10-CM

## 2023-12-13 PROCEDURE — 90837 PSYTX W PT 60 MINUTES: CPT | Performed by: SOCIAL WORKER

## 2023-12-13 NOTE — PSYCH
Virtual Regular Visit    Verification of patient location:    Patient is located at Home in the following state in which I hold an active license [place]      Assessment/Plan:    Problem List Items Addressed This Visit          Other    OCD (obsessive compulsive disorder)     Other Visit Diagnoses       Post-traumatic stress disorder, chronic    -  Primary            Goals addressed in session: Goal 1 and Goal 2          Reason for visit is   Chief Complaint   Patient presents with    Virtual Regular Visit          Encounter provider [facility]    Provider located at [id number] [place] [address]  [phone]      Recent Visits  No visits were found meeting these conditions. Showing recent visits within past 7 days and meeting all other requirements  Today's Visits  Date Type Provider Dept   [address] Therapist [facility]   Showing today's visits and meeting all other requirements  Future Appointments  No visits were found meeting these conditions. Showing future appointments within next 150 days and meeting all other requirements       The patient was identified by name and date of birth. Emre Munoz was informed that this is a telemedicine visit and [facility] She agrees to proceed. .  My office door was closed. No one else was in the room. She acknowledged consent and understanding of privacy and security of the video platform. The patient has agreed to participate and understands they can discontinue the visit at any time. Patient is aware this is a billable service. Subjective  Emre Munoz is a 40 y.o. female  .       HPI     Past Medical History:   Diagnosis Date    GERD (gastroesophageal reflux disease)     Migraines        Past Surgical History:   Procedure Laterality Date     SECTION      SINUS SURGERY      TONSILLECTOMY         Current Outpatient Medications   Medication Sig Dispense Refill    diphenhydrAMINE HCl, Sleep, 50 MG CAPS Take 50 mg by mouth      melatonin 3 mg Take 3 mg by mouth daily at bedtime      PARoxetine (PAXIL-CR) 37.5 mg 24 hr tablet Take 1 tablet (37.5 mg total) by mouth every morning 90 tablet 1     No current facility-administered medications for this visit. Allergies   Allergen Reactions    Dust Mite Extract Hives       Review of Systems    Video Exam    There were no vitals filed for this visit. Physical Exam     Behavioral Health Psychotherapy Progress Note    Psychotherapy Provided: Individual Psychotherapy     1. Post-traumatic stress disorder, chronic        2. Mixed obsessional thoughts and acts            Goals addressed in session: Goal 1 and Goal 2     DATA: Therapist met with Dante Rosado. She discussed that she was feeling very overwhelmed. She noted that she had not been paid during her time on leave, and now she had returned to work and had not received a paycheck in 3 weeks. She discussed that she was unable to afford [employer] services to work or her court hearing, which she attempted to continue, however contacted the wrong court system. This caused her to lose a large portion of custody of her children and increase her child support. She noted that she didn't want to lose her children. She also discussed struggles with her mother and their relationship. Mother remains harmful to her emotional state and causes further harm to client. Therapist encouraged step by step view of tasks rather than overall view. She agreed to this and will begin to focus on what she is able to control. Therapist will continue to maintain support to the client as it remains necessary to reduce heightened stressors.    During this session, this clinician used the following therapeutic modalities: Client-centered Therapy, Cognitive Processing Therapy, Mindfulness-based Strategies, and Supportive Psychotherapy    Substance Abuse was not addressed during this session. If the client is diagnosed with a co-occurring substance use disorder, please indicate any changes in the frequency or amount of use: na. Stage of change for addressing substance use diagnoses: No substance use/Not applicable    ASSESSMENT:  Marla Soler presents with a Euthymic/ normal, Anxious, and Depressed mood. her affect is Normal range and intensity and Tearful, which is congruent, with her mood and the content of the session. The client has made progress on their goals. Marla Soler presents with a minimal risk of suicide, minimal risk of self-harm, and minimal risk of harm to others. For any risk assessment that surpasses a "low" rating, a safety plan must be developed. A safety plan was indicated: no  If yes, describe in detail na    PLAN: Between sessions, Marla Soler will focus on locus of control. At the next session, the therapist will use Supportive Psychotherapy to address anxiety. Behavioral Health Treatment Plan and Discharge Planning: Marla Soler is aware of and agrees to continue to work on their treatment plan. They have identified and are working toward their discharge goals.  yes    Visit start and stop times:    12/13/23  Start Time: 1406  Stop Time: 1459  Total Visit Time: 53 minutes

## 2023-12-14 ENCOUNTER — TELEPHONE (OUTPATIENT)
Dept: PSYCHIATRY | Facility: CLINIC | Age: 38
End: 2023-12-14

## 2023-12-20 ENCOUNTER — TELEMEDICINE (OUTPATIENT)
Dept: PSYCHIATRY | Facility: CLINIC | Age: 38
End: 2023-12-20
Payer: COMMERCIAL

## 2023-12-20 DIAGNOSIS — R45.86 MOOD SWINGS: ICD-10-CM

## 2023-12-20 DIAGNOSIS — F42.2 MIXED OBSESSIONAL THOUGHTS AND ACTS: ICD-10-CM

## 2023-12-20 DIAGNOSIS — F43.10 PTSD (POST-TRAUMATIC STRESS DISORDER): Primary | ICD-10-CM

## 2023-12-20 DIAGNOSIS — F41.1 GENERALIZED ANXIETY DISORDER: ICD-10-CM

## 2023-12-20 PROCEDURE — 90833 PSYTX W PT W E/M 30 MIN: CPT | Performed by: STUDENT IN AN ORGANIZED HEALTH CARE EDUCATION/TRAINING PROGRAM

## 2023-12-20 PROCEDURE — 99214 OFFICE O/P EST MOD 30 MIN: CPT | Performed by: STUDENT IN AN ORGANIZED HEALTH CARE EDUCATION/TRAINING PROGRAM

## 2023-12-20 RX ORDER — LAMOTRIGINE 25 MG/1
TABLET ORAL DAILY
Qty: 90 TABLET | Refills: 0 | Status: SHIPPED | OUTPATIENT
Start: 2023-12-20 | End: 2024-02-02
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
## 2024-02-14 ENCOUNTER — TELEMEDICINE (OUTPATIENT)
Dept: PSYCHIATRY | Facility: CLINIC | Age: 39
End: 2024-02-14
Payer: COMMERCIAL

## 2024-02-14 DIAGNOSIS — F43.10 PTSD (POST-TRAUMATIC STRESS DISORDER): ICD-10-CM

## 2024-02-14 DIAGNOSIS — F41.1 GENERALIZED ANXIETY DISORDER: Primary | ICD-10-CM

## 2024-02-14 DIAGNOSIS — F42.2 MIXED OBSESSIONAL THOUGHTS AND ACTS: ICD-10-CM

## 2024-02-14 PROCEDURE — 99214 OFFICE O/P EST MOD 30 MIN: CPT | Performed by: STUDENT IN AN ORGANIZED HEALTH CARE EDUCATION/TRAINING PROGRAM

## 2024-02-14 RX ORDER — LAMOTRIGINE 100 MG/1
100 TABLET ORAL DAILY
Qty: 90 TABLET | Refills: 1 | Status: SHIPPED | OUTPATIENT
Start: 2024-02-14

## 2024-02-14 RX ORDER — PAROXETINE HYDROCHLORIDE HEMIHYDRATE 37.5 MG/1
37.5 TABLET, FILM COATED, EXTENDED RELEASE ORAL EVERY MORNING
Qty: 90 TABLET | Refills: 1 | Status: SHIPPED | OUTPATIENT
Start: 2024-02-14
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
## 2024-02-21 ENCOUNTER — TELEMEDICINE (OUTPATIENT)
Dept: BEHAVIORAL/MENTAL HEALTH CLINIC | Facility: CLINIC | Age: 39
End: 2024-02-21
Payer: COMMERCIAL

## 2024-02-21 DIAGNOSIS — F43.12 POST-TRAUMATIC STRESS DISORDER, CHRONIC: Primary | ICD-10-CM

## 2024-02-21 DIAGNOSIS — F42.2 MIXED OBSESSIONAL THOUGHTS AND ACTS: ICD-10-CM

## 2024-02-21 PROCEDURE — 90837 PSYTX W PT 60 MINUTES: CPT | Performed by: SOCIAL WORKER

## 2024-02-28 NOTE — PSYCH
Virtual Regular Visit    Verification of patient location:    Patient is located at Home in the following state in which I hold an active license [place]      Assessment/Plan:    Problem List Items Addressed This Visit    None      Goals addressed in session: Goal 1 and Goal 2          Reason for visit is   Chief Complaint   Patient presents with    Virtual Regular Visit          Encounter provider Corrie Adler    Provider located at [facility] THERAPIST [facility]  [facility]  [address] [id number] [phone]      Recent Visits  Date Type Provider Dept   24 Telemedicine Corrie Adler [facility] Therapist [facility]   Showing recent visits within past 7 days and meeting all other requirements  Future Appointments  No visits were found meeting these conditions.  Showing future appointments within next 150 days and meeting all other requirements       The patient was identified by name and date of birth. Manju Khalil was informed that this is a telemedicine visit and that the visit is being conducted throughthe Epic Embedded platform. She agrees to proceed..  My office door was closed. No one else was in the room.  She acknowledged consent and understanding of privacy and security of the video platform. The patient has agreed to participate and understands they can discontinue the visit at any time.    Patient is aware this is a billable service.     Subjective  Manju Khalil is a 38 y.o. female  .      HPI     Past Medical History:   Diagnosis Date    GERD (gastroesophageal reflux disease)     Migraines        Past Surgical History:   Procedure Laterality Date     SECTION      SINUS SURGERY      TONSILLECTOMY         Current Outpatient Medications   Medication Sig Dispense Refill    diphenhydrAMINE HCl, Sleep, 50 MG CAPS Take 50 mg by mouth      lamoTRIgine (LaMICtal) 100 mg tablet Take 1 tablet (100 mg total) by mouth daily  90 tablet 1    melatonin 3 mg Take 3 mg by mouth daily at bedtime      PARoxetine (PAXIL-CR) 37.5 mg 24 hr tablet Take 1 tablet (37.5 mg total) by mouth every morning 90 tablet 1     No current facility-administered medications for this visit.        Allergies   Allergen Reactions    Dust Mite Extract Hives       Review of Systems    Video Exam    There were no vitals filed for this visit.    Physical Exam     Behavioral Health Psychotherapy Progress Note    Psychotherapy Provided: Individual Psychotherapy     1. Post-traumatic stress disorder, chronic        2. Mixed obsessional thoughts and acts            Goals addressed in session: Goal 1 and Goal 2     DATA: Therapist met with Manju.  She discussed feeling improved and noted that she was now focused on getting .  She shared how she had been feeling much better since the introduction of medication.  Therapist explored her life changes and how she has managed to sustain exercise and changed mood.  Client also shared that she continues to focus on care of her children and their emotional health.   During this session, this clinician used the following therapeutic modalities: Client-centered Therapy, Cognitive Behavioral Therapy, Cognitive Processing Therapy, Mindfulness-based Strategies, and Supportive Psychotherapy    Substance Abuse was not addressed during this session. If the client is diagnosed with a co-occurring substance use disorder, please indicate any changes in the frequency or amount of use: na. Stage of change for addressing substance use diagnoses: No substance use/Not applicable    ASSESSMENT:  Manju Khalil presents with a Euthymic/ normal mood.     her affect is Normal range and intensity, which is congruent, with her mood and the content of the session. The client has made progress on their goals.     Manju Khalil presents with a minimal risk of suicide, minimal risk of self-harm, and minimal risk of harm to others.    For any  "risk assessment that surpasses a \"low\" rating, a safety plan must be developed.    A safety plan was indicated: no  If yes, describe in detail na    PLAN: Between sessions, Manju Khlail will focus on maintaining stability. At the next session, the therapist will use Supportive Psychotherapy to address anxiety.    Behavioral Health Treatment Plan and Discharge Planning: Manju Khalil is aware of and agrees to continue to work on their treatment plan. They have identified and are working toward their discharge goals. yes    Visit start and stop times:    02/21/24  Start Time: 1401  Stop Time: 1500  Total Visit Time: 59 minutes    "

## 2024-03-08 ENCOUNTER — TELEPHONE (OUTPATIENT)
Age: 39
End: 2024-03-08

## 2024-03-08 NOTE — TELEPHONE ENCOUNTER
Patient called states positive pregnancy test. LMP 1/31/24. Scheduled dating and viability appointment 4/3 in [place]. Patient verbalized understanding.

## 2024-03-11 ENCOUNTER — PATIENT MESSAGE (OUTPATIENT)
Dept: OBGYN CLINIC | Facility: CLINIC | Age: 39
End: 2024-03-11

## 2024-03-13 NOTE — PATIENT COMMUNICATION
Pt called in wanting to clarify if any [facility] providers provided this service. Reviewed that none do and we recommend follow up with either the [facility] or [facility] if she would like this service. Pt verbalized understanding.

## 2024-03-18 ENCOUNTER — TELEPHONE (OUTPATIENT)
Dept: PSYCHIATRY | Facility: CLINIC | Age: 39
End: 2024-03-18

## 2024-03-18 DIAGNOSIS — F42.2 MIXED OBSESSIONAL THOUGHTS AND ACTS: ICD-10-CM

## 2024-03-18 DIAGNOSIS — F41.1 GENERALIZED ANXIETY DISORDER: ICD-10-CM

## 2024-03-18 DIAGNOSIS — F43.10 PTSD (POST-TRAUMATIC STRESS DISORDER): ICD-10-CM

## 2024-03-18 RX ORDER — LAMOTRIGINE 100 MG/1
50 TABLET ORAL DAILY
Qty: 90 TABLET | Refills: 1 | Status: SHIPPED | OUTPATIENT
Start: 2024-03-18

## 2024-03-18 RX ORDER — PAROXETINE HYDROCHLORIDE HEMIHYDRATE 12.5 MG/1
TABLET, FILM COATED, EXTENDED RELEASE ORAL EVERY MORNING
Qty: 21 TABLET | Refills: 0 | Status: SHIPPED | OUTPATIENT
Start: 2024-03-18 | End: 2024-04-01

## 2024-03-18 NOTE — TELEPHONE ENCOUNTER
Pt called, said that she just found out she is pregnant and she'd like to talk to you about her medications and if it's safe to continue taking them while pregnant    Said that she also has questions about her diagnosis    She's asking if you can call her

## 2024-03-18 NOTE — TELEPHONE ENCOUNTER
"Spoke with Manju directly regarding regarding recent pregnancy news. We discussed use of Paxil and Lamictal during pregnancy and risks/benefits associated with both. From a psychiatric perspective, psychoeducation was provided regarding Paxil and how this agent is often not recommended for use and thus, she was agreeable to cross-taper to Zoloft, which is historically the \"safest\" medication during pregnancy and lactation. I spoke at length with Manju today regarding Zoloft use during pregnancy and the risks/benefits/alternatives. I highlighted benefits of use during pregnancy to which she voiced understanding. I also discussed potential side effects such as: spontaneous , low birth weight, prematurity,  serotonin syndrome,  withdrawal symptoms, and pulmonary hypertension of  to which she voiced understanding and consented to treatment regarding Zoloft. We also discussed recently stability with Lamictal and reluctance to discontinue this agent. As such, she was receptive to trial of lower dose (50mg) given plan to mitigate polypharmacy during pregnancy. As per published data, risk of cleft lip/palate associated with Lamictal is similar to that of spontaneous malformations (3-5%), offering no increased risk for concern. Will optimize both agents at pregnancy progresses, if warranted. Cross-tapering plan is as follows:    - Reduce Paxil XR 37.5mg to 25mg Daily - continue for 1 week then transition to 12.5mg Daily - continue for one week then D/C  - Start Zoloft 25mg Daily for 1 week then optimize to 50mg Daily after 1 week  - Reduce Lamictal 100mg Daily to 50mg Daily    All instructions provided to patient were reviewed and patient repeated instructions back for clarity. New scripts sent to pharmacy.   "

## 2024-03-20 ENCOUNTER — TELEMEDICINE (OUTPATIENT)
Dept: BEHAVIORAL/MENTAL HEALTH CLINIC | Facility: CLINIC | Age: 39
End: 2024-03-20
Payer: COMMERCIAL

## 2024-03-20 DIAGNOSIS — F43.12 POST-TRAUMATIC STRESS DISORDER, CHRONIC: Primary | ICD-10-CM

## 2024-03-20 DIAGNOSIS — F42.2 MIXED OBSESSIONAL THOUGHTS AND ACTS: ICD-10-CM

## 2024-03-20 PROCEDURE — 90837 PSYTX W PT 60 MINUTES: CPT | Performed by: SOCIAL WORKER

## 2024-03-20 NOTE — PSYCH
Virtual Regular Visit    Verification of patient location:    Patient is located at Home in the following state in which I hold an active license [place]      Assessment/Plan:    Problem List Items Addressed This Visit          Behavioral Health    OCD (obsessive compulsive disorder)     Other Visit Diagnoses       Post-traumatic stress disorder, chronic    -  Primary            Goals addressed in session: Goal 1 and Goal 2          Reason for visit is   Chief Complaint   Patient presents with    Virtual Regular Visit          Encounter provider Corrie Adler    Provider located at [facility] THERAPIST OP  [facility] THERAPIST MENTAL HEALTH OUTPATIENT  [address] [facility] [address] [phone]      Recent Visits  No visits were found meeting these conditions.  Showing recent visits within past 7 days and meeting all other requirements  Today's Visits  Date Type Provider Dept   03/20/24 Telemedicine Corrie Adler [facility] Therapist M[facility]   Showing today's visits and meeting all other requirements  Future Appointments  No visits were found meeting these conditions.  Showing future appointments within next 150 days and meeting all other requirements       The patient was identified by name and date of birth. Manju Khalil was informed that this is a telemedicine visit and that the visit is being conducted throughthe Epic Embedded platform. She agrees to proceed..  My office door was closed. No one else was in the room.  She acknowledged consent and understanding of privacy and security of the video platform. The patient has agreed to participate and understands they can discontinue the visit at any time.    Patient is aware this is a billable service.     Subjective  Manju Khalil is a 38 y.o. female  .      HPI     Past Medical History:   Diagnosis Date    GERD (gastroesophageal reflux disease)     Migraines        Past Surgical History:   Procedure Laterality Date      SECTION      SINUS SURGERY      TONSILLECTOMY         Current Outpatient Medications   Medication Sig Dispense Refill    diphenhydrAMINE HCl, Sleep, 50 MG CAPS Take 50 mg by mouth      lamoTRIgine (LaMICtal) 100 mg tablet Take 0.5 tablets (50 mg total) by mouth daily 90 tablet 1    melatonin 3 mg Take 3 mg by mouth daily at bedtime      PARoxetine (PAXIL-CR) 12.5 mg 24 hr tablet Take 2 tablets (25 mg total) by mouth every morning for 7 days, THEN 1 tablet (12.5 mg total) every morning for 7 days. 21 tablet 0    sertraline (Zoloft) 50 mg tablet Take 0.5 tablets (25 mg total) by mouth daily for 7 days, THEN 1 tablet (50 mg total) daily. 90 tablet 0     No current facility-administered medications for this visit.        Allergies   Allergen Reactions    Dust Mite Extract Hives       Review of Systems    Video Exam    There were no vitals filed for this visit.    Physical Exam     Behavioral Health Psychotherapy Progress Note    Psychotherapy Provided: Individual Psychotherapy     1. Post-traumatic stress disorder, chronic        2. Mixed obsessional thoughts and acts            Goals addressed in session: Goal 1 and Goal 2     DATA: Therapist met with Manju.  She shared increased anxiety related to work and treatment that she receives there.  She noted feeling that she is often verbally attacked and written up and does not understand these accusations.  Therapist discussed this, as she had been doing much better.  She discussed that she felt better, but that her employer was much harder on her even though she felt that she was performing better. She also discussed that they did not pay her appropriately during her duration of time on leave and she suffered great consequences due to this.  Therapist supported her through this session.  She discussed feeling fearful of leaving the home and being around others at work for fear of accusations and doing thing to persecute herself.  Therapist discussed remote work  "for the time being and wrote a letter in favor of this.  Therapist will continue to support the client and her mental health.   During this session, this clinician used the following therapeutic modalities: Client-centered Therapy, Cognitive Behavioral Therapy, Cognitive Processing Therapy, Motivational Interviewing, and Supportive Psychotherapy    Substance Abuse was not addressed during this session. If the client is diagnosed with a co-occurring substance use disorder, please indicate any changes in the frequency or amount of use: na. Stage of change for addressing substance use diagnoses: No substance use/Not applicable    ASSESSMENT:  Manju Khalil presents with a Euthymic/ normal mood.     her affect is Normal range and intensity and Tearful, which is congruent, with her mood and the content of the session. The client has made progress on their goals.     Manju Khalil presents with a minimal risk of suicide, minimal risk of self-harm, and minimal risk of harm to others.    For any risk assessment that surpasses a \"low\" rating, a safety plan must be developed.    A safety plan was indicated: no  If yes, describe in detail na    PLAN: Between sessions, Manju Khalil will continue to comply with treatment recommendations. . At the next session, the therapist will use Supportive Psychotherapy to address anxiety.    Behavioral Health Treatment Plan and Discharge Planning: Manju Khalil is aware of and agrees to continue to work on their treatment plan. They have identified and are working toward their discharge goals. yes    Visit start and stop times:    03/20/24  Start Time: 1400  Stop Time: 1456  Total Visit Time: 56 minutes      "

## 2024-03-28 ENCOUNTER — TELEPHONE (OUTPATIENT)
Age: 39
End: 2024-03-28

## 2024-04-03 ENCOUNTER — TELEMEDICINE (OUTPATIENT)
Dept: BEHAVIORAL/MENTAL HEALTH CLINIC | Facility: CLINIC | Age: 39
End: 2024-04-03
Payer: COMMERCIAL

## 2024-04-03 DIAGNOSIS — F42.2 MIXED OBSESSIONAL THOUGHTS AND ACTS: ICD-10-CM

## 2024-04-03 DIAGNOSIS — F43.12 POST-TRAUMATIC STRESS DISORDER, CHRONIC: Primary | ICD-10-CM

## 2024-04-03 PROCEDURE — 90837 PSYTX W PT 60 MINUTES: CPT | Performed by: SOCIAL WORKER

## 2024-04-03 NOTE — PSYCH
Virtual Regular Visit    Verification of patient location:    Patient is located at Home in the following state in which I hold an active license [place]      Assessment/Plan:    Problem List Items Addressed This Visit          Behavioral Health    OCD (obsessive compulsive disorder)     Other Visit Diagnoses       Post-traumatic stress disorder, chronic    -  Primary            Goals addressed in session: Goal 1 and Goal 2          Reason for visit is   Chief Complaint   Patient presents with    Virtual Regular Visit          Encounter provider Corrie Adler    Provider located at [facility] THERAPIST OP  [facility] THERAPIST MENTAL HEALTH OUTPATIENT  [address] [facility] [address] [phone]      Recent Visits  No visits were found meeting these conditions.  Showing recent visits within past 7 days and meeting all other requirements  Today's Visits  Date Type Provider Dept   04/03/24 Telemedicine Corrie Adler [facility] Therapist [facility]   Showing today's visits and meeting all other requirements  Future Appointments  No visits were found meeting these conditions.  Showing future appointments within next 150 days and meeting all other requirements       The patient was identified by name and date of birth. Manju Khalil was informed that this is a telemedicine visit and that the visit is being conducted throughthe Epic Embedded platform. She agrees to proceed..  My office door was closed. No one else was in the room.  She acknowledged consent and understanding of privacy and security of the video platform. The patient has agreed to participate and understands they can discontinue the visit at any time.    Patient is aware this is a billable service.     Subjective  Manju Khalil is a 38 y.o. female  .      HPI     Past Medical History:   Diagnosis Date    GERD (gastroesophageal reflux disease)     Migraines        Past Surgical History:   Procedure Laterality Date      SECTION      SINUS SURGERY      TONSILLECTOMY         Current Outpatient Medications   Medication Sig Dispense Refill    diphenhydrAMINE HCl, Sleep, 50 MG CAPS Take 50 mg by mouth      lamoTRIgine (LaMICtal) 100 mg tablet Take 0.5 tablets (50 mg total) by mouth daily 90 tablet 1    melatonin 3 mg Take 3 mg by mouth daily at bedtime      PARoxetine (PAXIL-CR) 12.5 mg 24 hr tablet Take 2 tablets (25 mg total) by mouth every morning for 7 days, THEN 1 tablet (12.5 mg total) every morning for 7 days. 21 tablet 0    sertraline (Zoloft) 50 mg tablet Take 0.5 tablets (25 mg total) by mouth daily for 7 days, THEN 1 tablet (50 mg total) daily. 90 tablet 0     No current facility-administered medications for this visit.        Allergies   Allergen Reactions    Dust Mite Extract Hives       Review of Systems    Video Exam    There were no vitals filed for this visit.    Physical Exam     Behavioral Health Psychotherapy Progress Note    Psychotherapy Provided: Individual Psychotherapy     1. Post-traumatic stress disorder, chronic        2. Mixed obsessional thoughts and acts            Goals addressed in session: Goal 1 and Goal 2     DATA: Therapist met with Manju.  She shared thoughts related to her pregnancy and changes in her relationship with her mother.  Therapist discussed her recent interactions with her mother and how those have impacted her.  Therapist discussed her feelings and how to set boundaries.  She noted feeling positive and capable of setting boundaries with her mother in approrpaite ways, but fearful of her mothers' reactions.  Therapist and client also discussed her medication reactions and shared those with psychiatry.    During this session, this clinician used the following therapeutic modalities: Client-centered Therapy, Cognitive Behavioral Therapy, Cognitive Processing Therapy, Mindfulness-based Strategies, and Supportive Psychotherapy    Substance Abuse was not addressed during this  "session. If the client is diagnosed with a co-occurring substance use disorder, please indicate any changes in the frequency or amount of use: na. Stage of change for addressing substance use diagnoses: No substance use/Not applicable    ASSESSMENT:  Manju Khalil presents with a Euthymic/ normal mood.     her affect is Normal range and intensity, which is congruent, with her mood and the content of the session. The client has made progress on their goals.     Manju Khalil presents with a minimal risk of suicide, minimal risk of self-harm, and minimal risk of harm to others.    For any risk assessment that surpasses a \"low\" rating, a safety plan must be developed.    A safety plan was indicated: no  If yes, describe in detail na    PLAN: Between sessions, Manju Khalil will set boundaries with her mother. At the next session, the therapist will use Supportive Psychotherapy to address anxiety.    Behavioral Health Treatment Plan and Discharge Planning: Manju Khalil is aware of and agrees to continue to work on their treatment plan. They have identified and are working toward their discharge goals. yes    Visit start and stop times:    04/03/24  Start Time: 1333  Stop Time: 1430  Total Visit Time: 57 minutes      "

## 2024-04-04 ENCOUNTER — TELEPHONE (OUTPATIENT)
Facility: HOSPITAL | Age: 39
End: 2024-04-04

## 2024-04-04 ENCOUNTER — TELEPHONE (OUTPATIENT)
Dept: PSYCHIATRY | Facility: CLINIC | Age: 39
End: 2024-04-04

## 2024-04-04 ENCOUNTER — INITIAL PRENATAL (OUTPATIENT)
Dept: OBGYN CLINIC | Facility: CLINIC | Age: 39
End: 2024-04-04
Payer: COMMERCIAL

## 2024-04-04 VITALS
WEIGHT: 196 LBS | DIASTOLIC BLOOD PRESSURE: 86 MMHG | SYSTOLIC BLOOD PRESSURE: 120 MMHG | BODY MASS INDEX: 33.46 KG/M2 | HEIGHT: 64 IN

## 2024-04-04 DIAGNOSIS — N91.2 AMENORRHEA: Primary | ICD-10-CM

## 2024-04-04 PROCEDURE — 76801 OB US < 14 WKS SINGLE FETUS: CPT | Performed by: NURSE PRACTITIONER

## 2024-04-04 PROCEDURE — 99203 OFFICE O/P NEW LOW 30 MIN: CPT | Performed by: NURSE PRACTITIONER

## 2024-04-04 NOTE — PROGRESS NOTES
[facility] - [address], [address], [place] [address]    Assessment/Plan:  1. Amenorrhea  -     AMB US OB < 14 weeks single or first gestation level 1  -     Ambulatory Referral to Maternal Fetal Medicine; Future; Expected date: 2024    9 weeks 1 day by LMP, EDC 2024 confirmed by US today.  Next visit 2 weeks with Nurse for OB intake, 4 weeks for initial prenatal with Provider.  Call office with any concerns.    Subjective:   Manju Khalil is a 38 y.o.  female.  CC: Ultrasound      HPI:   38 year old  presents for office visit for dating and viability ultrasound. LMP 2024, placing her at 9 weeks 1 day gestation. Reports regular, monthly cycles. She feels well and has no complaints today.        Gyn History  Patient's last menstrual period was 2024.       Last pap smear: 2023    She  reports being sexually active and has had partner(s) who are male.       OB History      Past Medical History:  No date: GERD (gastroesophageal reflux disease)  No date: Migraines     Past Surgical History:  No date:  SECTION  No date: SINUS SURGERY  No date: TONSILLECTOMY     Social History     Tobacco Use    Smoking status: Never    Smokeless tobacco: Never   Vaping Use    Vaping status: Never Used   Substance Use Topics    Alcohol use: Not Currently     Comment: social    Drug use: Never          Current Outpatient Medications:     lamoTRIgine (LaMICtal) 100 mg tablet, Take 0.5 tablets (50 mg total) by mouth daily, Disp: 90 tablet, Rfl: 1    melatonin 3 mg, Take 3 mg by mouth daily at bedtime, Disp: , Rfl:     sertraline (Zoloft) 50 mg tablet, Take 0.5 tablets (25 mg total) by mouth daily for 7 days, THEN 1 tablet (50 mg total) daily., Disp: 90 tablet, Rfl: 0    diphenhydrAMINE HCl, Sleep, 50 MG CAPS, Take 50 mg by mouth (Patient not taking: Reported on 2024), Disp: , Rfl:     PARoxetine (PAXIL-CR) 12.5 mg 24 hr tablet, Take 2 tablets (25 mg  "total) by mouth every morning for 7 days, THEN 1 tablet (12.5 mg total) every morning for 7 days., Disp: 21 tablet, Rfl: 0    She is allergic to dust mite extract..    ROS: Review of Systems Negative except as noted in HPI    Objective:  /86 (BP Location: Right arm, Patient Position: Sitting, Cuff Size: Standard)   Ht 5' 4\" (1.626 m)   Wt 88.9 kg (196 lb)   LMP 01/31/2024   BMI 33.64 kg/m²      Physical Exam  Constitutional:       General: She is not in acute distress.     Appearance: Normal appearance.   Abdominal:      General: There is no distension.      Palpations: Abdomen is soft.      Tenderness: There is no abdominal tenderness.   Skin:     General: Skin is warm and dry.   Psychiatric:         Thought Content: Thought content normal.         Judgment: Judgment normal.       Transabdominal ultrasound demonstrates a SIUP, 8 weeks 5 days by CRL, CM noted at 156 BPM.  "

## 2024-04-04 NOTE — TELEPHONE ENCOUNTER
Called patient to schedule MFM appointment, based on referral issued to Maternal Fetal Medicine by OB office.    Left voicemail requesting patient to call back and schedule appointment, with office number for return call [phone].

## 2024-04-04 NOTE — TELEPHONE ENCOUNTER
"Message sent to Dr. Barrera ([facility] 4/15/24):    Patient: MANJU MOTA [[id number]] (: 1985)   Phone:     Message:   Hi Dr. Barrera,     I am meeting with Manju.  She is asking to go back to 100 mgs of Lamictal.  She said that it was working better for her.  She is having more side effects.  She is pretty tearful overall, but definitely has a lot on her plate, is pregnant, and is changing medications.  We discussed how emotional is to be expected.  She already knew that, but just \"felt better with the 100 mgs\".     Thanks!     Hope you're doing well!     Corrie       Sent by: CORRIE ALFONSO   To: Juan Miguel Barrera MD   Date: 2024   Priority: Routine     "

## 2024-04-04 NOTE — TELEPHONE ENCOUNTER
Nurse left a VM for Manju - requested update in regards to note regarding Lamictal increase. Awaiting return call.

## 2024-04-05 NOTE — TELEPHONE ENCOUNTER
Spoke with Manju Khalil - patient is aware and will reach out to PCP if needed. Manju asks for Dr. Barrera to review once he is back in the office.

## 2024-04-05 NOTE — TELEPHONE ENCOUNTER
I reviewed Dr. Barrera's notes from 3/18/24. Because of the first trimester pregnancy Dr. Barrera recommended decreasing Lamictal dose to 50 mg per day. I cannot increase Lamictal dose back to 100 mg per day - Manju would need to review it with Dr. Barrera due to risks in pregnancy. If she cannot wait for Dr. Barrera's return, she should be referred for a follow up with PHP so her medications can be managed closely during her pregnancy.

## 2024-04-05 NOTE — TELEPHONE ENCOUNTER
"Forwarding for covering provider review in Doctor's absence.    Nurse spoke with Manju - states she is more tearful and emotional on Lamictal 50 mg - \"my mood was more stable on 100 mg\".  Manju is 9 weeks, 2 days into her pregnancy.  Manju is requesting to increase to 100 mg daily.     Please advise.      "

## 2024-04-05 NOTE — TELEPHONE ENCOUNTER
4/5/24-pt did not respond to [employer] communication requesting [employer] service, but did present for appt

## 2024-04-08 NOTE — TELEPHONE ENCOUNTER
Outreach attempted today, 4/8/24, to discuss questions/concerns regarding Lamictal. Outreach was not successful but HIPAA compliant voicemail placed requesting call back.

## 2024-04-09 ENCOUNTER — TELEPHONE (OUTPATIENT)
Age: 39
End: 2024-04-09

## 2024-04-11 ENCOUNTER — TELEPHONE (OUTPATIENT)
Dept: PERINATAL CARE | Facility: OTHER | Age: 39
End: 2024-04-11

## 2024-04-11 NOTE — TELEPHONE ENCOUNTER
Called patient to schedule MFM appointment, based on referral issued to Maternal Fetal Medicine by OB office.    Left voicemail requesting patient to call back and schedule appointment, with office number for return call [phone].

## 2024-04-15 ENCOUNTER — PATIENT OUTREACH (OUTPATIENT)
Dept: OBGYN CLINIC | Facility: CLINIC | Age: 39
End: 2024-04-15

## 2024-04-15 ENCOUNTER — INITIAL PRENATAL (OUTPATIENT)
Dept: OBGYN CLINIC | Facility: CLINIC | Age: 39
End: 2024-04-15

## 2024-04-15 VITALS
DIASTOLIC BLOOD PRESSURE: 70 MMHG | SYSTOLIC BLOOD PRESSURE: 118 MMHG | WEIGHT: 196 LBS | HEIGHT: 64 IN | BODY MASS INDEX: 33.46 KG/M2

## 2024-04-15 DIAGNOSIS — Z3A.10 10 WEEKS GESTATION OF PREGNANCY: ICD-10-CM

## 2024-04-15 DIAGNOSIS — Z31.430 ENCOUNTER OF FEMALE FOR TESTING FOR GENETIC DISEASE CARRIER STATUS FOR PROCREATIVE MANAGEMENT: ICD-10-CM

## 2024-04-15 DIAGNOSIS — O09.521 AMA (ADVANCED MATERNAL AGE) MULTIGRAVIDA 35+, FIRST TRIMESTER: ICD-10-CM

## 2024-04-15 DIAGNOSIS — Z34.81 PRENATAL CARE, SUBSEQUENT PREGNANCY, FIRST TRIMESTER: Primary | ICD-10-CM

## 2024-04-15 PROCEDURE — NOBC: Performed by: OBSTETRICS & GYNECOLOGY

## 2024-04-15 NOTE — PATIENT INSTRUCTIONS
Congratulations!! Please review our Pregnancy Essential Guide and [facility] L&D Virtual tour from our networks website.     [facility] Pregnancy Essentials Guide  [facility] ([email or web address])     [facility] ([email or web address]

## 2024-04-15 NOTE — PROGRESS NOTES
OB INTAKE INTERVIEW  Patient is 38 y.o. who presents for OB intake at 10.5 wks  She is accompanied by FOB Hiren  during this encounter  The father of her baby (Hiren ) is involved in the pregnancy and is 29 years old.      Last Menstrual Period: 24  Ultrasound: Measured  8 weeks 5 days on 24  Estimated Date of Delivery: 2024    Signs/Symptoms of Pregnancy  Current pregnancy symptoms:   Mild nausea:  Try to eat 5-6 small meals a day, increase protein with meals/snacks, in order to keep stomach full at all times. Try bland foods like plain crackers, toast as well as carbonated drinks like gingerale. Hard peppermint or chantal candy, is a natural treatment for nausea,  B- pops  with B6 ( available at the pharmacy), B6 25 mg in the AM and 25 mg in PM. May combine with Unisom 12.5mg at night. Unisom may cause drowsiness.  High complex carbohydrate before bedtime.   Constipation YES, encouraged to increase fiber, hydration, fruits/vegetables. Directed to safe OTC list.   Headaches YES, dull headaches from dehydration, No migraines with this pregnancy thus far.   Cramping/spotting no  PICA cravings no    Diabetes-  There is no height or weight on file to calculate BMI.  If patient has 1 or more, please order early 1 hour GTT  History of GDM no  BMI >35 no  History of PCOS or current metformin use no  History of LGA/macrosomic infant (4000g/9lbs) no    If patient has 2 or more, please order early 1 hour GTT  BMI>30 YES, 33.64  AMA YES, 37 yo  First degree relative with type 2 diabetes no  History of chronic HTN, hyperlipidemia, elevated A1C no  High risk race (, , ,  or ) no  Early 1 hour gtt test ordered   Hypertension- if you answer yes to any of the following, please order baseline preeclampsia labs (cbc, comprehensive metabolic panel, urine protein creatinine ratio, uric acid)  History of of chronic HTN no  History of gestational HTN  no  History of preeclampsia, eclampsia, or HELLP syndrome no  History of diabetes no  History of lupus, autoimmune disease, kidney disease no    Thyroid- if yes order TSH with reflex T4  History of thyroid disease no    Bleeding Disorder or Hx of DVT-patient or first degree relative with history of. Order the following if not done previously.   (Factor V, antithrombin III, prothrombin gene mutation, protein C and S Ag, lupus anticoagulant, anticardiolipin, beta-2 glycoprotein)   no    OB/GYN-  History of abnormal pap smear YES, Reports having an abnormal pap at age 21. Colposcopy was performed, no treatment and pa smears reverted to normal.  Date of last pap smear 2023-Neg pap/Neg HPV   History of HPV Reports having an abnormal pap at age 21.  History of Herpes/HSV no  History of other STI (gonorrhea, chlamydia, trich) YES, gonorrhea   History of prior  YES x 2 with with history of second and third degree laceration   History of prior  YES, 2011  History of  delivery prior to 36 weeks 6 days YES, @ 34 weeks   History of blood transfusion no  Ok for blood transfusion Yes     Substance screening-   History of tobacco use no  Currently using tobacco no  Substance Use Screen Level:     MRSA Screening-   Does the pt have a hx of MRSA? no    Immunizations:  Influenza vaccine given this season 2023  Discussed Tdap vaccine Yes  Discussed COVID Vaccine YES     Genetic/MFM-  Do you or your partner have a history of any of the following in yourselves or first degree relatives?  Cystic fibrosis no  Spinal muscular atrophy no  Hemoglobinopathy/Sickle Cell/Thalassemia no  Fragile X Intellectual Disability no    If yes, discuss Carrier Screening and recommend consultation with MFM/Genetic Counseling and place specific MFM Referral for.    If no, discuss Carrier Screening being completed once in a lifetime as a standard of care lab test. Place orders for Cystic Fibrosis Gene Test (UTJ077) and  Spinal Muscular Atrophy DNA ([id number])    Pt unsure if she had testing done in the past, desires to proceed with CF/SMA screening     Appointment for Nuchal Translucency Ultrasound at [facility] scheduled for 4/24/24   Desires genetic screening, would like to disuses cost options prior to testing.     Interview education  [facility] Pregnancy Essentials Book reviewed, discussed and attached to their AVS     Ambulatory referral to  placed.   EPDS: 7  History of PTSD, anxiety and intrusive thoughts-follows psychiatrist Dr. Barrera with [facility] for medication management.   Prenatal lab work scripts Yes   Extra labs ordered:  CF/SMA   Early 1 hour gtt     Aspirin/Preeclampsia Screen    Risk Level Risk Factor Recommendation   LOW Prior Uncomplicated full-term delivery YES No Aspirin recommendation        MODERATE Nulliparity no Recommend low-dose aspirin if     BMI>30 YES 2 or more moderate risk factors    Family History Preeclampsia (mother/sister) no     35yr old or greater YES      or Low Socioeconomic no     IVF Pregnancy  no     Personal History Risks (low birth weight, prior adverse preg outcome, >10yr preg interval) no         HIGH History of Preeclampsia no Recommend low-dose aspirin if     Multifetal gestation no 1 or more high risk factors    Chronic HTN no     Type 1 or 2 Diabetes no     Renal Disease no     Autoimmune Disease  no      Contraindications to ASA therapy:  NSAID/ ASA allergy: no  Nasal polyps: no  Asthma with history of ASA induced bronchospasm: no  Relative contraindications:  History of GI bleed: no  Active peptic ulcer disease: no  Severe hepatic dysfunction: As a teenage she was Depakote and developed drug induced Hepatitis   Cholestasis in pregnancy   Patient should be recommended to take ASA 162mg during this pregnancy from 12-36wks to lower her risk of preeclampsia:  Moderate Risk-to be discussed and initiated at First PN appointment.       The patient has a history  now or in prior pregnancy notable for:  Manju is a new patient to [facility]. This is Manju's third pregnancy. Her pregnancy history includes  2011-LTCS Breech presentation and cholestasis in pregnancy   10/2/12--, second degree laceration   2014- delivery at 34 weeks   AMA         Details that I feel the provider should be aware of:   Kala is a new patient to [facility].She was a prior patient of Dr. Narayanan. Pt will obtain a copy of records to have forwarded to office. This is Manju's third pregnancy. Her pregnancy history includes.  2011-LTCS Breech presentation and cholestasis in pregnancy   (Record requested completed at office visit today)   10/2/12--, second degree laceration   2014- delivery at 34 weeks and third degree laceration     AMA and BMI- 33.64-Early 1 hour gtt test ordered.   PTSD-Under the care the psychiatrist Dr Barrera, taking Zoloft 50 mg and Lamictal 50 mg daily.  Had been taking Paxil prior to pregnancy and slowly weaned off Paxil over 2 weeks.off Paxil for 4 weeks. Felt she was having a break through of anxiety and PTSD with medication changes and dosing but getting feelings have improved  since Zoloft is taking effect.   Anxiety-Stopped Paxil last 2 weeks, following Dr. Barrera. Taking Zoloft. Will discuss with Dr. Barrera if dosing needs to be adjusted.   Mixed Obsessive thoughts -controlled with Zoloft and Lamictal   Migraines with aura -Last migraine 6 months ago, treated with Ibuprofen. Reports occasional headaches, feels they are dehydration related. Aware may take Tylenol as needed and sips of caffeine     PN1 visit scheduled 24 with Dr. Newman.  The patient was oriented to our practice, the navigator role, reviewed delivering physicians and [facility] for Delivery. All questions were answered.    Interviewed by: SURENDRA Bills RN   (Record requested Dr. Narayanan completed at office visit today)
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
## 2024-04-17 ENCOUNTER — TELEMEDICINE (OUTPATIENT)
Dept: BEHAVIORAL/MENTAL HEALTH CLINIC | Facility: CLINIC | Age: 39
End: 2024-04-17
Payer: COMMERCIAL

## 2024-04-17 ENCOUNTER — TELEPHONE (OUTPATIENT)
Dept: PSYCHIATRY | Facility: CLINIC | Age: 39
End: 2024-04-17

## 2024-04-17 DIAGNOSIS — F43.12 POST-TRAUMATIC STRESS DISORDER, CHRONIC: Primary | ICD-10-CM

## 2024-04-17 DIAGNOSIS — F42.2 MIXED OBSESSIONAL THOUGHTS AND ACTS: ICD-10-CM

## 2024-04-17 PROCEDURE — 90834 PSYTX W PT 45 MINUTES: CPT | Performed by: SOCIAL WORKER

## 2024-04-17 NOTE — TELEPHONE ENCOUNTER
Manju left a VM that she was returning a call. Not clear from EHR who specifically may have called recently. (See Encounter 4/4/24.)

## 2024-04-17 NOTE — PSYCH
Virtual Regular Visit    Verification of patient location:    Patient is located at Home in the following state in which I hold an active license [place]      Assessment/Plan:    Problem List Items Addressed This Visit          Behavioral Health    OCD (obsessive compulsive disorder)     Other Visit Diagnoses       Post-traumatic stress disorder, chronic    -  Primary            Goals addressed in session: Goal 1 and Goal 2          Reason for visit is   Chief Complaint   Patient presents with    Virtual Regular Visit          Encounter provider Corrie Adler    Provider located at [facility]  [facility]AP[facility] OUTPAT[facility]  [address] [facility] [facility] [address] [phone]      Recent Visits  No visits were found meeting these conditions.  Showing recent visits within past 7 days and meeting all other requirements  Today's Visits  Date Type Provider Dept   04/17/24 Telemedicine Corrie Adler [facility]   Showing today's visits and meeting all other requirements  Future Appointments  No visits were found meeting these conditions.  Showing future appointments within next 150 days and meeting all other requirements       The patient was identified by name and date of birth. Manju Khalil was informed that this is a telemedicine visit and that the visit is being conducted throughthe Epic Embedded platform. She agrees to proceed..  My office door was closed. No one else was in the room.  She acknowledged consent and understanding of privacy and security of the video platform. The patient has agreed to participate and understands they can discontinue the visit at any time.    Patient is aware this is a billable service.     Subjective  Manju Khalil is a 38 y.o. female  .      HPI     Past Medical History:   Diagnosis Date    Anxiety     Follows Dr. Barrera -currently on Zoloft and Lamictal    Carpal tunnel syndrome     GERD (gastroesophageal  reflux disease)     Irritable bowel syndrome (IBS)     Migraines     with Aura    PTSD (post-traumatic stress disorder)     Follows Dr. Barrera ([facility]) currently on Zoloft and Lamictal       Past Surgical History:   Procedure Laterality Date     SECTION      SINUS SURGERY      TONSILLECTOMY         Current Outpatient Medications   Medication Sig Dispense Refill    diphenhydrAMINE HCl, Sleep, 50 MG CAPS Take 50 mg by mouth (Patient not taking: Reported on 2024)      lamoTRIgine (LaMICtal) 100 mg tablet Take 0.5 tablets (50 mg total) by mouth daily 90 tablet 1    melatonin 3 mg Take 3 mg by mouth daily at bedtime (Patient not taking: Reported on 4/15/2024)      PARoxetine (PAXIL-CR) 12.5 mg 24 hr tablet Take 2 tablets (25 mg total) by mouth every morning for 7 days, THEN 1 tablet (12.5 mg total) every morning for 7 days. 21 tablet 0    Prenatal MV-Min-Fe Fum-FA-DHA (PRENATAL MULTIVITAMIN PLUS DHA PO) Take by mouth      sertraline (Zoloft) 50 mg tablet Take 0.5 tablets (25 mg total) by mouth daily for 7 days, THEN 1 tablet (50 mg total) daily. 90 tablet 0     No current facility-administered medications for this visit.        Allergies   Allergen Reactions    Dust Mite Extract Hives    Pollen Extract-Tree Extract [Pollen Extract] Nasal Congestion       Review of Systems    Video Exam    There were no vitals filed for this visit.    Physical Exam     Behavioral Health Psychotherapy Progress Note    Psychotherapy Provided: Individual Psychotherapy     1. Post-traumatic stress disorder, chronic        2. Mixed obsessional thoughts and acts            Goals addressed in session: Goal 1 and Goal 2     DATA: Therapist met with Manju.  She discussed her feelings regarding the loss of her father. She noted that April is a difficult month as it is the anniversary of the loss of her father.  She discussed this loss and how it impacts her.  She shared that this loss makes her more frustrated with her  "mother and how she parented her.  She noted how she parented and how she wants to parent her new child.  Therapist explored this with her and discussed how she is now focused on parenting.  Therapist will continue to explore her parenting style and encouraged her to focus on how she parents and what she can offer, rather than what she was offered.  She was able to identify with this and will continue to focus on this through this review.   During this session, this clinician used the following therapeutic modalities: Client-centered Therapy, Cognitive Behavioral Therapy, Cognitive Processing Therapy, Mindfulness-based Strategies, and Supportive Psychotherapy    Substance Abuse was not addressed during this session. If the client is diagnosed with a co-occurring substance use disorder, please indicate any changes in the frequency or amount of use: na. Stage of change for addressing substance use diagnoses: No substance use/Not applicable    ASSESSMENT:  Manju Khalil presents with a Euthymic/ normal, Anxious, and Depressed mood.     her affect is Normal range and intensity and Tearful, which is congruent, with her mood and the content of the session. The client has made progress on their goals.     Manju Khalil presents with a minimal risk of suicide, minimal risk of self-harm, and minimal risk of harm to others.    For any risk assessment that surpasses a \"low\" rating, a safety plan must be developed.    A safety plan was indicated: no  If yes, describe in detail na    PLAN: Between sessions, Manju Khalil will focus on her abilities though this review. . At the next session, the therapist will use Supportive Psychotherapy to address anxiety.    Behavioral Health Treatment Plan and Discharge Planning: Manju Khalil is aware of and agrees to continue to work on their treatment plan. They have identified and are working toward their discharge goals. yes    Visit start and stop " times:    04/17/24  Start Time: 1358  Stop Time: 1449  Total Visit Time: 51 minutes

## 2024-04-17 NOTE — TELEPHONE ENCOUNTER
Nurse spoke with Manju - she was returning call from Dr. Barrera from 4/8/24.  Manju states she would like to discuss going back to 100 mg of Lamictal.      Updated original encounter from 4/4/24 and sent same to Dr. Barrera.

## 2024-04-22 ENCOUNTER — TELEPHONE (OUTPATIENT)
Dept: PSYCHIATRY | Facility: CLINIC | Age: 39
End: 2024-04-22

## 2024-04-22 DIAGNOSIS — F42.2 MIXED OBSESSIONAL THOUGHTS AND ACTS: ICD-10-CM

## 2024-04-22 DIAGNOSIS — F41.1 GENERALIZED ANXIETY DISORDER: ICD-10-CM

## 2024-04-22 DIAGNOSIS — F43.10 PTSD (POST-TRAUMATIC STRESS DISORDER): ICD-10-CM

## 2024-04-22 RX ORDER — LAMOTRIGINE 100 MG/1
100 TABLET ORAL DAILY
Qty: 90 TABLET | Refills: 1 | Status: SHIPPED | OUTPATIENT
Start: 2024-04-22

## 2024-04-22 NOTE — TELEPHONE ENCOUNTER
Spoke with Manju directly. We discussed optimization of Lamictal back to 100mg as the benefits outweigh the risks at this juncture. She was educated extensively in the past regarding Zoloft and Lamictal using during pregnancy. New script for 100mg tablets sent to pharmacy today.

## 2024-04-22 NOTE — TELEPHONE ENCOUNTER
Pt called, said that she wants to speak with you about changing the dose of the lamictal     She's asking if you can call her to discuss

## 2024-04-24 ENCOUNTER — ROUTINE PRENATAL (OUTPATIENT)
Dept: PERINATAL CARE | Facility: OTHER | Age: 39
End: 2024-04-24
Payer: COMMERCIAL

## 2024-04-24 ENCOUNTER — TELEPHONE (OUTPATIENT)
Dept: PERINATAL CARE | Facility: OTHER | Age: 39
End: 2024-04-24

## 2024-04-24 VITALS
WEIGHT: 199.2 LBS | DIASTOLIC BLOOD PRESSURE: 62 MMHG | HEIGHT: 64 IN | SYSTOLIC BLOOD PRESSURE: 124 MMHG | BODY MASS INDEX: 34.01 KG/M2 | HEART RATE: 91 BPM

## 2024-04-24 DIAGNOSIS — Z33.1 PREGNANT STATE, INCIDENTAL: ICD-10-CM

## 2024-04-24 DIAGNOSIS — Z36.9 UNSPECIFIED ANTENATAL SCREENING: ICD-10-CM

## 2024-04-24 DIAGNOSIS — E66.09 OTHER OBESITY DUE TO EXCESS CALORIES AFFECTING PREGNANCY, ANTEPARTUM: ICD-10-CM

## 2024-04-24 DIAGNOSIS — N91.2 AMENORRHEA: ICD-10-CM

## 2024-04-24 DIAGNOSIS — O99.210 OTHER OBESITY DUE TO EXCESS CALORIES AFFECTING PREGNANCY, ANTEPARTUM: ICD-10-CM

## 2024-04-24 DIAGNOSIS — Z3A.12 12 WEEKS GESTATION OF PREGNANCY: ICD-10-CM

## 2024-04-24 DIAGNOSIS — O09.521 MULTIGRAVIDA OF ADVANCED MATERNAL AGE IN FIRST TRIMESTER: ICD-10-CM

## 2024-04-24 DIAGNOSIS — O34.211 MATERNAL CARE DUE TO LOW TRANSVERSE UTERINE SCAR FROM PREVIOUS CESAREAN DELIVERY: Primary | ICD-10-CM

## 2024-04-24 DIAGNOSIS — Z36.82 ENCOUNTER FOR NUCHAL TRANSLUCENCY TESTING: ICD-10-CM

## 2024-04-24 PROCEDURE — 76813 OB US NUCHAL MEAS 1 GEST: CPT | Performed by: OBSTETRICS & GYNECOLOGY

## 2024-04-24 PROCEDURE — 76801 OB US < 14 WKS SINGLE FETUS: CPT | Performed by: OBSTETRICS & GYNECOLOGY

## 2024-04-24 PROCEDURE — 36415 COLL VENOUS BLD VENIPUNCTURE: CPT | Performed by: OBSTETRICS & GYNECOLOGY

## 2024-04-24 PROCEDURE — 99203 OFFICE O/P NEW LOW 30 MIN: CPT | Performed by: OBSTETRICS & GYNECOLOGY

## 2024-04-24 RX ORDER — OMEGA-3/DHA/EPA/FISH OIL 60 MG-90MG
500 CAPSULE ORAL DAILY
COMMUNITY

## 2024-04-24 RX ORDER — ASPIRIN 81 MG/1
162 TABLET, CHEWABLE ORAL DAILY
Qty: 180 TABLET | Refills: 1 | Status: SHIPPED | OUTPATIENT
Start: 2024-04-24

## 2024-04-24 NOTE — TELEPHONE ENCOUNTER
Lvm for patient to call the office to schedule her level 2 ultrasound in the time frame of 6/19/24-7/10/24.

## 2024-04-24 NOTE — PROGRESS NOTES
Patient chose to have [facility] [id number] Non-Invasive Prenatal Screen [id number] PLUS w/ SCA, WITH fetal sex.  Patient choose to be billed through insurance.     Patient given brochure and is aware [facility] will contact patient's insurance and coordinate coverage.  Provided [facility] contact information. General inquiries [phone], Cost estimates [phone] and [facility] Billing [phone]. Website [employer].     Blood collection tubes labeled with patient identifiers (name, medical record number, and date of birth).     Filled out [facility] order form. Patient chose to have blood drawn in our office at time of visit. NIPS was drawn from left arm with a straight needle by ISABEL Munson MA. .      If patient chose to have blood work drawn at a [facility] lab we requested patient notify MFM (via phone call or [employer] message) when blood collected so office can follow up on results.       Maternal Fetal Medicine will have results in approximately 5-7 business days and will call patient or notify via [employer].  Patient aware viewing lab result online will reveal fetal sex if ordered.    Patient verbalized understanding of all instructions and no questions at this time.

## 2024-04-29 LAB
CFDNA.FET/CFDNA.TOTAL SFR FETUS: NORMAL %
CITATION REF LAB TEST: NORMAL
FET 13+18+21+X+Y ANEUP PLAS.CFDNA: NEGATIVE
FET CHR 21 TS PLAS.CFDNA QL: NEGATIVE
FET CHR 21 TS PLAS.CFDNA QL: NEGATIVE
FET MS X RISK WBC.DNA+CFDNA QL: NOT DETECTED
FET SEX PLAS.CFDNA DOSAGE CFDNA: NORMAL
FET TS 13 RISK PLAS.CFDNA QL: NEGATIVE
FET X + Y ANEUP RISK PLAS.CFDNA SEQ-IMP: NOT DETECTED
GA EST FROM CONCEPTION DATE: NORMAL D
GESTATIONAL AGE > 9:: YES
LAB DIRECTOR NAME PROVIDER: NORMAL
LAB DIRECTOR NAME PROVIDER: NORMAL
LABORATORY COMMENT REPORT: NORMAL
LIMITATIONS OF THE TEST: NORMAL
NEGATIVE PREDICTIVE VALUE: NORMAL
PERFORMANCE CHARACTERISTICS: NORMAL
POSITIVE PREDICTIVE VALUE: NORMAL
REF LAB TEST METHOD: NORMAL
SL AMB NOTE:: NORMAL
TEST PERFORMANCE INFO SPEC: NORMAL

## 2024-04-30 NOTE — RESULT ENCOUNTER NOTE
I have reviewed the results of the NIPS which are low risk.  Please call patient and notify her of these reassuring results if she has not viewed on MyChart. Please ensure she is notified of recommendation of MSAFP to be ordered and followed up through her primary Obstetrician's office.      Thank you, Freddie Acosta MD

## 2024-05-15 ENCOUNTER — TELEMEDICINE (OUTPATIENT)
Dept: BEHAVIORAL/MENTAL HEALTH CLINIC | Facility: CLINIC | Age: 39
End: 2024-05-15
Payer: COMMERCIAL

## 2024-05-15 ENCOUNTER — TELEMEDICINE (OUTPATIENT)
Dept: PSYCHIATRY | Facility: CLINIC | Age: 39
End: 2024-05-15
Payer: COMMERCIAL

## 2024-05-15 DIAGNOSIS — F43.10 PTSD (POST-TRAUMATIC STRESS DISORDER): ICD-10-CM

## 2024-05-15 DIAGNOSIS — F43.10 PTSD (POST-TRAUMATIC STRESS DISORDER): Primary | ICD-10-CM

## 2024-05-15 DIAGNOSIS — F42.2 MIXED OBSESSIONAL THOUGHTS AND ACTS: ICD-10-CM

## 2024-05-15 DIAGNOSIS — F41.1 GENERALIZED ANXIETY DISORDER: Primary | ICD-10-CM

## 2024-05-15 PROCEDURE — 99214 OFFICE O/P EST MOD 30 MIN: CPT | Performed by: STUDENT IN AN ORGANIZED HEALTH CARE EDUCATION/TRAINING PROGRAM

## 2024-05-15 PROCEDURE — 90834 PSYTX W PT 45 MINUTES: CPT | Performed by: SOCIAL WORKER

## 2024-05-15 RX ORDER — SERTRALINE HYDROCHLORIDE 100 MG/1
100 TABLET, FILM COATED ORAL
Qty: 90 TABLET | Refills: 1 | Status: SHIPPED | OUTPATIENT
Start: 2024-05-15
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
## 2024-05-15 NOTE — BH TREATMENT PLAN
Outpatient Behavioral Health Psychotherapy Treatment Plan    Manju Merarymbjustinkasia  1985     Date of Initial Psychotherapy Assessment: 11/14/23   Date of Current Treatment Plan: 05/15/24  Treatment Plan Target Date: 11/15/24  Treatment Plan Expiration Date: 11/15/24    Diagnosis:   1. PTSD (post-traumatic stress disorder)        2. Mixed obsessional thoughts and acts            Area(s) of Need: support, understanding, kindness    Long Term Goal 1 (in the client's own words): Reduce intrusive thoughts (anxiety)    Stage of Change: Action    Target Date for completion: 11/24     Anticipated therapeutic modalities: CBT, Supportive Counseling, Mindfulness, Medication Management, [place] Setting, Strengths Based     People identified to complete this goal: Corrie Nunes, Psychiatry      Objective 1: (identify the means of measuring success in meeting the objective): I will be able to learn at least 5 new coping skills to reduce symptoms of anxiety by at least 80%, each week, as self reported      Objective 2: (identify the means of measuring success in meeting the objective): I will comply with medication management as agreed upon at least 75% of the time following meeting with my psychiatrist.       Long Term Goal 2 (in the client's own words): Establish and maintain healthy boundaries and improve self care    Stage of Change: Action    Target Date for completion: 11/24     Anticipated therapeutic modalities: CBT, Communication, Medication Management, Supportive Counseling      People identified to complete this goal: Manju Denton, Psychiatry      Objective 1: (identify the means of measuring success in meeting the objective): I will be able to set and implement at least 5 boundaries with others, as I see necessary, and maintain them at least 90% of the time, as self reported.       Objective 2: (identify the means of measuring success in meeting the objective): I will be able to engage in at least 3 activities  that promote self care each month, as self reported.      I am currently under the care of a [facility] psychiatric provider: yes    My [facility] psychiatric provider is: Dr. Barrera    I am currently taking psychiatric medications: Yes, as prescribed    I feel that I will be ready for discharge from mental health care when I reach the following (measurable goal/objective): I am able to feel confident and stable within my own environment at home and at work, at least 80% of the time, and am able to seek support from appropritate resources when I am feeling stressed and in need of support at least 80% of the time.     For children and adults who have a legal guardian:   Has there been any change to custody orders and/or guardianship status? No. If yes, attach updated documentation.    I have created my Crisis Plan and have been offered a copy of this plan    Behavioral Health Treatment Plan [facility]: Diagnosis and Treatment Plan explained to Manju Khalil acknowledges an understanding of their diagnosis. Manju Khalil agrees to this treatment plan.    I have been offered a copy of this Treatment Plan. Yes    Manju Khalil, 1985, actively participated in the creation of this treatment plan during a virtual session, using the [employer] Embedded platform.   Manju Khalil  provided verbal consent on 5/15/2024 at 2:00 PM. The treatment plan was transcribed into the Electronic Health Record at a later time.

## 2024-05-15 NOTE — PSYCH
Virtual Regular Visit    Verification of patient location:    Patient is located at Home in the following state in which I hold an active license [place]      Assessment/Plan:    Problem List Items Addressed This Visit          Behavioral Health    PTSD (post-traumatic stress disorder) - Primary    OCD (obsessive compulsive disorder)       Goals addressed in session: Goal 1 and Goal 2          Reason for visit is   Chief Complaint   Patient presents with    Virtual Regular Visit          Encounter provider Corrie Adler      Recent Visits  No visits were found meeting these conditions.  Showing recent visits within past 7 days and meeting all other requirements  Today's Visits  Date Type Provider Dept   05/15/24 Telemedicine Corrie Adler [facility] Therapist [facility]   Showing today's visits and meeting all other requirements  Future Appointments  No visits were found meeting these conditions.  Showing future appointments within next 150 days and meeting all other requirements       The patient was identified by name and date of birth. Manju Khalil was informed that this is a telemedicine visit and that the visit is being conducted through the Epic Embedded platform. She agrees to proceed..  My office door was closed. No one else was in the room.  She acknowledged consent and understanding of privacy and security of the video platform. The patient has agreed to participate and understands they can discontinue the visit at any time.    Patient is aware this is a billable service.     Subjective  Manju Khalil is a 38 y.o. female  .      HPI     Past Medical History:   Diagnosis Date    Anxiety     Follows Dr. Barrera -currently on Zoloft and Lamictal    Carpal tunnel syndrome     GERD (gastroesophageal reflux disease)     Irritable bowel syndrome (IBS)     Migraines     with Aura    PTSD (post-traumatic stress disorder)     Follows Dr. Barrera ([facility] psychiatry) currently on Zoloft and Lamictal       Past  Surgical History:   Procedure Laterality Date     SECTION      SINUS SURGERY      TONSILLECTOMY         Current Outpatient Medications   Medication Sig Dispense Refill    aspirin 81 mg chewable tablet Chew 2 tablets (162 mg total) daily 180 tablet 1    diphenhydrAMINE HCl, Sleep, 50 MG CAPS Take 50 mg by mouth (Patient not taking: Reported on 2024)      lamoTRIgine (LaMICtal) 100 mg tablet Take 1 tablet (100 mg total) by mouth daily 90 tablet 1    melatonin 3 mg Take 3 mg by mouth daily at bedtime (Patient not taking: Reported on 4/15/2024)      Omega-3 Fatty Acids (Fish Oil) 500 MG CAPS Take 500 mg by mouth in the morning      Prenatal MV-Min-Fe Fum-FA-DHA (PRENATAL MULTIVITAMIN PLUS DHA PO) Take by mouth      sertraline (ZOLOFT) 100 mg tablet Take 1 tablet (100 mg total) by mouth daily at bedtime 90 tablet 1     No current facility-administered medications for this visit.        Allergies   Allergen Reactions    Dust Mite Extract Hives    Pollen Extract-Tree Extract [Pollen Extract] Nasal Congestion       Review of Systems    Video Exam    There were no vitals filed for this visit.    Physical Exam     Behavioral Health Psychotherapy Progress Note    Psychotherapy Provided: Individual Psychotherapy     1. PTSD (post-traumatic stress disorder)        2. Mixed obsessional thoughts and acts            Goals addressed in session: Goal 1 and Goal 2     DATA: Therapist met with Manju.  She discussed her feelings regarding working and how she was struggling with her divorce and emotional state as well as difficulties regarding mental health following struggles with work.  Therapist provided supportive counseling and focused on how the client could maintain emotional stability through this review.  Therapist will continue to support her through struggles. Therapist agrees the client is not in a state of stability and should not work.  Therapist will assist with completing [place] paperwork.    During this  "session, this clinician used the following therapeutic modalities: Client-centered Therapy, Cognitive Behavioral Therapy, Cognitive Processing Therapy, Mindfulness-based Strategies, and Supportive Psychotherapy    Substance Abuse was not addressed during this session. If the client is diagnosed with a co-occurring substance use disorder, please indicate any changes in the frequency or amount of use: na. Stage of change for addressing substance use diagnoses: No substance use/Not applicable    ASSESSMENT:  Manju Khalil presents with a Euthymic/ normal mood.     her affect is Normal range and intensity, which is congruent, with her mood and the content of the session. The client has made progress on their goals.     Manju Khalil presents with a minimal risk of suicide, minimal risk of self-harm, and minimal risk of harm to others.    For any risk assessment that surpasses a \"low\" rating, a safety plan must be developed.    A safety plan was indicated: no  If yes, describe in detail na    PLAN: Between sessions, Manju Khalil will reduce stressors as able. At the next session, the therapist will use Supportive Psychotherapy to address anxiety.    Behavioral Health Treatment Plan and Discharge Planning: Manju Khalil is aware of and agrees to continue to work on their treatment plan. They have identified and are working toward their discharge goals. yes    Visit start and stop times:    05/15/24  Start Time: 1400  Stop Time: 1445  Total Visit Time: 45 minutes    "

## 2024-05-16 NOTE — BH CRISIS PLAN
Client Name: Manju Khalil       Client YOB: 1985    [place] Safety Plan      Creation Date: 5/15/24 Update Date: 5/15/25   Created By: Corrie Adler Last Updated By: Corrie Adler      Step 1: Warning Signs:   Warning Signs   Increased Anxiety   Increased paranoid thoughts   increased crying episodes            Step 2: Internal Coping Strategies:   Internal Coping Strategies   Exercise   Breathing   Shower            Step 3: People and social settings that provide distraction:   Name Contact Information   Friends in cell phone   Jessica in cell phone   Corrie [phone]            Step 4: People whom I can ask for help during a crisis:      Name Contact Information    Corrie [phone]      Step 5: Professionals or agencies I can contact during a crisis:      Clinican/Agency Name Phone Emergency Contact    [facility] [phone]       [facility] Emergency Department Emergency Department Phone Emergency Department Address    911          Crisis Phone Numbers:   Suicide Prevention Lifeline: Call or Text  986 Crisis Text Line: Text HOME to 405-584   Please note: Some [facility] do not have a separate number for Child/Adolescent specific crisis. If your county is not listed under Child/Adolescent, please call the adult number for your county      Adult Crisis Numbers: Child/Adolescent Crisis Numbers   [facility]: [phone] [facility]: [phone]   [facility]: [phone] [facility]: [phone]   [facility]: [phone] [place]: [phone]   [facility]: [phone] [place] [facility]: [phone]   [facility]: [phone]   [facility]: [phone]   [facility]: [phone]   [facility]: [phone] (daytime) [phone] (after hours, weekends, holidays)      Step 6: Making the environment safer (plan for lethal means safety):   Patient did not identify any lethal methods: Yes     Optional: What is  most important to me and worth living for?   My family, my children     [place] Safety Plan. Nicole Paz and Herberth Mercer. Used with permission of the authors.

## 2024-05-21 ENCOUNTER — TELEPHONE (OUTPATIENT)
Dept: OBGYN CLINIC | Facility: CLINIC | Age: 39
End: 2024-05-21

## 2024-05-21 NOTE — TELEPHONE ENCOUNTER
2ND TRIMESTER PHONE CALL - 15 wks 4 days - left message patient's voicemail to recall office  Patient not seen in office since OB intake 4/15/2024 - cancelled appointment with provider scheduled for 4/30/2024.  No future appts scheduled.  Has not had initial prenatal labs done.  Needs order to have MSAFP done if desires.  Needs to schedule Level 2 US @ [facility].

## 2024-05-28 NOTE — TELEPHONE ENCOUNTER
OB - 16 wks 6 days - 2nd phone call attempt re: 2nd trimester call & scheduling future OB appts & needs initial prenatal labs done.  thePlatform message also sent to patient.

## 2024-05-29 ENCOUNTER — TELEMEDICINE (OUTPATIENT)
Dept: BEHAVIORAL/MENTAL HEALTH CLINIC | Facility: CLINIC | Age: 39
End: 2024-05-29
Payer: COMMERCIAL

## 2024-05-29 DIAGNOSIS — F43.10 PTSD (POST-TRAUMATIC STRESS DISORDER): Primary | ICD-10-CM

## 2024-05-29 DIAGNOSIS — F42.2 MIXED OBSESSIONAL THOUGHTS AND ACTS: ICD-10-CM

## 2024-05-29 PROCEDURE — 90837 PSYTX W PT 60 MINUTES: CPT | Performed by: SOCIAL WORKER

## 2024-05-29 NOTE — PSYCH
Virtual Regular Visit    Verification of patient location:    Patient is located at Home in the following state in which I hold an active license [place]      Assessment/Plan:    Problem List Items Addressed This Visit          Behavioral Health    PTSD (post-traumatic stress disorder) - Primary    OCD (obsessive compulsive disorder)       Goals addressed in session: Goal 1 and Goal 2          Reason for visit is   Chief Complaint   Patient presents with    Virtual Regular Visit          Encounter provider Corrie Adler      Recent Visits  No visits were found meeting these conditions.  Showing recent visits within past 7 days and meeting all other requirements  Today's Visits  Date Type Provider Dept   05/29/24 Telemedicine Corrie Adler [facility] Therapist [facility]   Showing today's visits and meeting all other requirements  Future Appointments  No visits were found meeting these conditions.  Showing future appointments within next 150 days and meeting all other requirements       The patient was identified by name and date of birth. Manju Khalil was informed that this is a telemedicine visit and that the visit is being conducted through the Epic Embedded platform. She agrees to proceed..  My office door was closed. No one else was in the room.  She acknowledged consent and understanding of privacy and security of the video platform. The patient has agreed to participate and understands they can discontinue the visit at any time.    Patient is aware this is a billable service.     Subjective  Manju Khalil is a 38 y.o. female  .      HPI     Past Medical History:   Diagnosis Date    Anxiety     Follows Dr. Barrera -currently on Zoloft and Lamictal    Carpal tunnel syndrome     GERD (gastroesophageal reflux disease)     Irritable bowel syndrome (IBS)     Migraines     with Aura    PTSD (post-traumatic stress disorder)     Follows Dr. Barrera ([facility] psychiatry) currently on Zoloft and Lamictal       Past  Surgical History:   Procedure Laterality Date     SECTION      SINUS SURGERY      TONSILLECTOMY         Current Outpatient Medications   Medication Sig Dispense Refill    aspirin 81 mg chewable tablet Chew 2 tablets (162 mg total) daily 180 tablet 1    diphenhydrAMINE HCl, Sleep, 50 MG CAPS Take 50 mg by mouth (Patient not taking: Reported on 2024)      lamoTRIgine (LaMICtal) 100 mg tablet Take 1 tablet (100 mg total) by mouth daily 90 tablet 1    melatonin 3 mg Take 3 mg by mouth daily at bedtime (Patient not taking: Reported on 4/15/2024)      Omega-3 Fatty Acids (Fish Oil) 500 MG CAPS Take 500 mg by mouth in the morning      Prenatal MV-Min-Fe Fum-FA-DHA (PRENATAL MULTIVITAMIN PLUS DHA PO) Take by mouth      sertraline (ZOLOFT) 100 mg tablet Take 1 tablet (100 mg total) by mouth daily at bedtime 90 tablet 1     No current facility-administered medications for this visit.        Allergies   Allergen Reactions    Dust Mite Extract Hives    Pollen Extract-Tree Extract [Pollen Extract] Nasal Congestion       Review of Systems    Video Exam    There were no vitals filed for this visit.    Physical Exam     Behavioral Health Psychotherapy Progress Note    Psychotherapy Provided: Individual Psychotherapy     1. PTSD (post-traumatic stress disorder)        2. Mixed obsessional thoughts and acts            Goals addressed in session: Goal 1 and Goal 2     DATA: Therapist met with Manju.  Therapist and client discussed her anxiety and how she continues to struggle with her relationship with her ex-.  She noted that she continues to desire to have more time with her children and is lacking in any interaction from him related to this  Therapist discussed taking him to court for contempt of court.  She agreed that she would like to do so, but discussed feeling fearful.  Therapist explored and processed these emotions with her.  During this session, this clinician used the following therapeutic modalities:  "Client-centered Therapy, Cognitive Behavioral Therapy, Cognitive Processing Therapy, Mindfulness-based Strategies, and Supportive Psychotherapy    Substance Abuse was not addressed during this session. If the client is diagnosed with a co-occurring substance use disorder, please indicate any changes in the frequency or amount of use: na. Stage of change for addressing substance use diagnoses: No substance use/Not applicable    ASSESSMENT:  Manju Khalil presents with a Euthymic/ normal mood.     her affect is Normal range and intensity, which is congruent, with her mood and the content of the session. The client has made progress on their goals.     Manju Khalil presents with a minimal risk of suicide, minimal risk of self-harm, and minimal risk of harm to others.    For any risk assessment that surpasses a \"low\" rating, a safety plan must be developed.    A safety plan was indicated: no  If yes, describe in detail na    PLAN: Between sessions, Manju Khalil will continue to work towards her goals. At the next session, the therapist will use Supportive Psychotherapy to address anxiety.    Behavioral Health Treatment Plan and Discharge Planning: Manju Khalil is aware of and agrees to continue to work on their treatment plan. They have identified and are working toward their discharge goals. yes    Visit start and stop times:    05/29/24  Start Time: 1358  Stop Time: 1456  Total Visit Time: 58 minutes    "

## 2024-06-06 ENCOUNTER — TELEPHONE (OUTPATIENT)
Dept: PSYCHIATRY | Facility: CLINIC | Age: 39
End: 2024-06-06

## 2024-06-06 NOTE — TELEPHONE ENCOUNTER
LVM: attempted to contact client regarding insurance ending 5/31/24. Informed client to call back with updated insurance information and caller informed client of self-pay option. Caller stated they will made therapists aware of their situation.

## 2024-07-03 ENCOUNTER — TELEPHONE (OUTPATIENT)
Dept: BEHAVIORAL/MENTAL HEALTH CLINIC | Facility: CLINIC | Age: 39
End: 2024-07-03

## 2024-07-03 NOTE — TELEPHONE ENCOUNTER
Lvm: contacted client regarding 2 appointments on 7/10. Corrie Vitor stated we can cancel appt on 7/10 to wait for insurance to renew and keep appointment on 7/24. Asked client to call back if they would like to reschedule Med Mgmt appointment with Dr. Yen or self-pay for this appt.

## 2024-07-08 NOTE — TELEPHONE ENCOUNTER
Patient is calling regarding cancelling an appointment.    Date/Time: 7/10/24/2:00 PM    Reason: insurance (BCBS) termed 5/31/24; Client awaiting reinstatement.    Patient was rescheduled: YES [] NO [x]  Client will call to reschedule once insurance issue resolved    Patient requesting call back to reschedule: YES [] NO [x]

## 2024-07-22 ENCOUNTER — TELEPHONE (OUTPATIENT)
Dept: PSYCHIATRY | Facility: CLINIC | Age: 39
End: 2024-07-22

## 2024-07-22 NOTE — TELEPHONE ENCOUNTER
Patient is calling regarding cancelling an appointment.    Date/Time: 07/24/24 @ 1:30pm    Reason: Insurance issues    Patient was rescheduled: YES [] NO [x]  If yes, when was Patient reschedule for: n/a    Patient requesting call back to reschedule: YES [] NO [x]

## 2024-09-13 ENCOUNTER — TELEPHONE (OUTPATIENT)
Dept: OBGYN CLINIC | Facility: CLINIC | Age: 39
End: 2024-09-13

## 2024-09-13 NOTE — TELEPHONE ENCOUNTER
Yokasta,   Pt GA 32w2d. Pt has not been seen since OB intake 4/15/2024. No future appointments were scheduled.   Attempted to contact patient to inquire if she transferred care, pt never returned calls.

## 2024-11-03 ENCOUNTER — HOSPITAL ENCOUNTER (INPATIENT)
Facility: HOSPITAL | Age: 39
LOS: 4 days | Discharge: HOME | End: 2024-11-07
Attending: OBSTETRICS & GYNECOLOGY | Admitting: OBSTETRICS & GYNECOLOGY
Payer: COMMERCIAL

## 2024-11-03 DIAGNOSIS — Z98.891 STATUS POST PRIMARY LOW TRANSVERSE CESAREAN SECTION: ICD-10-CM

## 2024-11-03 DIAGNOSIS — F43.10 PTSD (POST-TRAUMATIC STRESS DISORDER): ICD-10-CM

## 2024-11-03 DIAGNOSIS — F42.2 MIXED OBSESSIONAL THOUGHTS AND ACTS: ICD-10-CM

## 2024-11-03 DIAGNOSIS — F41.1 GENERALIZED ANXIETY DISORDER: ICD-10-CM

## 2024-11-03 LAB
ABO GROUP BLD: NORMAL
ALBUMIN SERPL BCG-MCNC: 3.4 G/DL (ref 3.5–5)
ALP SERPL-CCNC: 177 U/L (ref 34–104)
ALT SERPL W P-5'-P-CCNC: 38 U/L (ref 7–52)
ANION GAP SERPL CALCULATED.3IONS-SCNC: 11 MMOL/L (ref 4–13)
AST SERPL W P-5'-P-CCNC: 63 U/L (ref 13–39)
BILIRUB SERPL-MCNC: 0.23 MG/DL (ref 0.2–1)
BLD GP AB SCN SERPL QL: NEGATIVE
BUN SERPL-MCNC: 9 MG/DL (ref 5–25)
CALCIUM ALBUM COR SERPL-MCNC: 9 MG/DL (ref 8.3–10.1)
CALCIUM SERPL-MCNC: 8.5 MG/DL (ref 8.4–10.2)
CHLORIDE SERPL-SCNC: 103 MMOL/L (ref 96–108)
CO2 SERPL-SCNC: 18 MMOL/L (ref 21–32)
CREAT SERPL-MCNC: 0.73 MG/DL (ref 0.6–1.3)
CREAT UR-MCNC: 51.6 MG/DL
ERYTHROCYTE [DISTWIDTH] IN BLOOD BY AUTOMATED COUNT: 14.9 % (ref 11.6–15.1)
GFR SERPL CREATININE-BSD FRML MDRD: 104 ML/MIN/1.73SQ M
GLUCOSE SERPL-MCNC: 70 MG/DL (ref 65–140)
GLUCOSE SERPL-MCNC: 72 MG/DL (ref 65–140)
HCT VFR BLD AUTO: 29.4 % (ref 34.8–46.1)
HGB BLD-MCNC: 9 G/DL (ref 11.5–15.4)
MCH RBC QN AUTO: 23.3 PG (ref 26.8–34.3)
MCHC RBC AUTO-ENTMCNC: 30.6 G/DL (ref 31.4–37.4)
MCV RBC AUTO: 76 FL (ref 82–98)
PLATELET # BLD AUTO: 232 THOUSANDS/UL (ref 149–390)
PMV BLD AUTO: 10.7 FL (ref 8.9–12.7)
POTASSIUM SERPL-SCNC: 3.9 MMOL/L (ref 3.5–5.3)
PROT SERPL-MCNC: 6.7 G/DL (ref 6.4–8.4)
PROT UR-MCNC: 24.8 MG/DL
PROT/CREAT UR: 0.5 MG/G{CREAT} (ref 0–0.1)
RBC # BLD AUTO: 3.87 MILLION/UL (ref 3.81–5.12)
RH BLD: NEGATIVE
SODIUM SERPL-SCNC: 132 MMOL/L (ref 135–147)
SPECIMEN EXPIRATION DATE: NORMAL
WBC # BLD AUTO: 10.46 THOUSAND/UL (ref 4.31–10.16)

## 2024-11-03 PROCEDURE — 86850 RBC ANTIBODY SCREEN: CPT | Performed by: OBSTETRICS & GYNECOLOGY

## 2024-11-03 PROCEDURE — 85027 COMPLETE CBC AUTOMATED: CPT | Performed by: OBSTETRICS & GYNECOLOGY

## 2024-11-03 PROCEDURE — 86780 TREPONEMA PALLIDUM: CPT | Performed by: OBSTETRICS & GYNECOLOGY

## 2024-11-03 PROCEDURE — 99211 OFF/OP EST MAY X REQ PHY/QHP: CPT

## 2024-11-03 PROCEDURE — 86706 HEP B SURFACE ANTIBODY: CPT | Performed by: OBSTETRICS & GYNECOLOGY

## 2024-11-03 PROCEDURE — 87340 HEPATITIS B SURFACE AG IA: CPT | Performed by: OBSTETRICS & GYNECOLOGY

## 2024-11-03 PROCEDURE — NC001 PR NO CHARGE: Performed by: OBSTETRICS & GYNECOLOGY

## 2024-11-03 PROCEDURE — 82948 REAGENT STRIP/BLOOD GLUCOSE: CPT

## 2024-11-03 PROCEDURE — 84156 ASSAY OF PROTEIN URINE: CPT | Performed by: OBSTETRICS & GYNECOLOGY

## 2024-11-03 PROCEDURE — 86762 RUBELLA ANTIBODY: CPT | Performed by: OBSTETRICS & GYNECOLOGY

## 2024-11-03 PROCEDURE — 86803 HEPATITIS C AB TEST: CPT | Performed by: OBSTETRICS & GYNECOLOGY

## 2024-11-03 PROCEDURE — 82570 ASSAY OF URINE CREATININE: CPT | Performed by: OBSTETRICS & GYNECOLOGY

## 2024-11-03 PROCEDURE — 86901 BLOOD TYPING SEROLOGIC RH(D): CPT | Performed by: OBSTETRICS & GYNECOLOGY

## 2024-11-03 PROCEDURE — 87389 HIV-1 AG W/HIV-1&-2 AB AG IA: CPT | Performed by: OBSTETRICS & GYNECOLOGY

## 2024-11-03 PROCEDURE — 86900 BLOOD TYPING SEROLOGIC ABO: CPT | Performed by: OBSTETRICS & GYNECOLOGY

## 2024-11-03 PROCEDURE — 83036 HEMOGLOBIN GLYCOSYLATED A1C: CPT | Performed by: OBSTETRICS & GYNECOLOGY

## 2024-11-03 PROCEDURE — 80053 COMPREHEN METABOLIC PANEL: CPT | Performed by: OBSTETRICS & GYNECOLOGY

## 2024-11-03 RX ORDER — ONDANSETRON 2 MG/ML
4 INJECTION INTRAMUSCULAR; INTRAVENOUS EVERY 6 HOURS PRN
Status: DISCONTINUED | OUTPATIENT
Start: 2024-11-03 | End: 2024-11-04

## 2024-11-03 RX ORDER — SODIUM CHLORIDE, SODIUM LACTATE, POTASSIUM CHLORIDE, CALCIUM CHLORIDE 600; 310; 30; 20 MG/100ML; MG/100ML; MG/100ML; MG/100ML
125 INJECTION, SOLUTION INTRAVENOUS CONTINUOUS
Status: DISCONTINUED | OUTPATIENT
Start: 2024-11-03 | End: 2024-11-04

## 2024-11-03 RX ORDER — BUTORPHANOL TARTRATE 1 MG/ML
1 INJECTION, SOLUTION INTRAMUSCULAR; INTRAVENOUS
Status: DISCONTINUED | OUTPATIENT
Start: 2024-11-03 | End: 2024-11-04

## 2024-11-03 RX ORDER — BUPIVACAINE HYDROCHLORIDE 2.5 MG/ML
30 INJECTION, SOLUTION EPIDURAL; INFILTRATION; INTRACAUDAL ONCE AS NEEDED
Status: DISCONTINUED | OUTPATIENT
Start: 2024-11-03 | End: 2024-11-04

## 2024-11-04 ENCOUNTER — ANESTHESIA EVENT (INPATIENT)
Dept: LABOR AND DELIVERY | Facility: HOSPITAL | Age: 39
End: 2024-11-04
Payer: COMMERCIAL

## 2024-11-04 ENCOUNTER — ANESTHESIA (INPATIENT)
Dept: LABOR AND DELIVERY | Facility: HOSPITAL | Age: 39
End: 2024-11-04
Payer: COMMERCIAL

## 2024-11-04 PROBLEM — O14.93 PREECLAMPSIA, THIRD TRIMESTER: Status: ACTIVE | Noted: 2024-11-04

## 2024-11-04 PROBLEM — O09.33 INSUFFICIENT PRENATAL CARE IN THIRD TRIMESTER: Status: ACTIVE | Noted: 2024-11-04

## 2024-11-04 LAB
ABO GROUP BLD: NORMAL
ALBUMIN SERPL BCG-MCNC: 3.2 G/DL (ref 3.5–5)
ALP SERPL-CCNC: 170 U/L (ref 34–104)
ALT SERPL W P-5'-P-CCNC: 38 U/L (ref 7–52)
ANION GAP SERPL CALCULATED.3IONS-SCNC: 9 MMOL/L (ref 4–13)
AST SERPL W P-5'-P-CCNC: 69 U/L (ref 13–39)
BASE EXCESS BLDCOA CALC-SCNC: -5 MMOL/L (ref 3–11)
BASE EXCESS BLDCOV CALC-SCNC: -4.7 MMOL/L (ref 1–9)
BILIRUB SERPL-MCNC: 0.24 MG/DL (ref 0.2–1)
BUN SERPL-MCNC: 10 MG/DL (ref 5–25)
CALCIUM ALBUM COR SERPL-MCNC: 8.9 MG/DL (ref 8.3–10.1)
CALCIUM SERPL-MCNC: 8.3 MG/DL (ref 8.4–10.2)
CHLORIDE SERPL-SCNC: 106 MMOL/L (ref 96–108)
CO2 SERPL-SCNC: 21 MMOL/L (ref 21–32)
CREAT SERPL-MCNC: 0.75 MG/DL (ref 0.6–1.3)
ERYTHROCYTE [DISTWIDTH] IN BLOOD BY AUTOMATED COUNT: 15 % (ref 11.6–15.1)
EST. AVERAGE GLUCOSE BLD GHB EST-MCNC: 140 MG/DL
GFR SERPL CREATININE-BSD FRML MDRD: 101 ML/MIN/1.73SQ M
GLUCOSE SERPL-MCNC: 83 MG/DL (ref 65–140)
GLUCOSE SERPL-MCNC: 89 MG/DL (ref 65–140)
HBA1C MFR BLD: 6.5 %
HBV SURFACE AB SER-ACNC: 408 MIU/ML
HBV SURFACE AG SER QL: NORMAL
HCO3 BLDCOA-SCNC: 22.8 MMOL/L (ref 17.3–27.3)
HCO3 BLDCOV-SCNC: 22.1 MMOL/L (ref 12.2–28.6)
HCT VFR BLD AUTO: 28.7 % (ref 34.8–46.1)
HCV AB SER QL: NORMAL
HGB BLD-MCNC: 8.9 G/DL (ref 11.5–15.4)
MCH RBC QN AUTO: 23.4 PG (ref 26.8–34.3)
MCHC RBC AUTO-ENTMCNC: 31 G/DL (ref 31.4–37.4)
MCV RBC AUTO: 75 FL (ref 82–98)
O2 CT VFR BLDCOA CALC: 4 ML/DL
OXYHGB MFR BLDCOA: 16.8 %
OXYHGB MFR BLDCOV: 36.8 %
PCO2 BLDCOA: 52.3 MM[HG] (ref 30–60)
PCO2 BLDCOV: 46.9 MM HG (ref 27–43)
PH BLDCOA: 7.26 [PH] (ref 7.23–7.43)
PH BLDCOV: 7.29 [PH] (ref 7.19–7.49)
PLATELET # BLD AUTO: 226 THOUSANDS/UL (ref 149–390)
PMV BLD AUTO: 10.7 FL (ref 8.9–12.7)
PO2 BLDCOA: 12.2 MM HG (ref 5–25)
PO2 BLDCOV: 18.1 MM HG (ref 15–45)
POTASSIUM SERPL-SCNC: 4.2 MMOL/L (ref 3.5–5.3)
PROT SERPL-MCNC: 6.2 G/DL (ref 6.4–8.4)
RBC # BLD AUTO: 3.81 MILLION/UL (ref 3.81–5.12)
RH BLD: NEGATIVE
RUBV IGG SERPL IA-ACNC: 30.8 IU/ML
SAO2 % BLDCOV: 8.5 ML/DL
SODIUM SERPL-SCNC: 136 MMOL/L (ref 135–147)
TREPONEMA PALLIDUM IGG+IGM AB [PRESENCE] IN SERUM OR PLASMA BY IMMUNOASSAY: NORMAL
WBC # BLD AUTO: 8.39 THOUSAND/UL (ref 4.31–10.16)

## 2024-11-04 PROCEDURE — 80053 COMPREHEN METABOLIC PANEL: CPT | Performed by: OBSTETRICS & GYNECOLOGY

## 2024-11-04 PROCEDURE — 59514 CESAREAN DELIVERY ONLY: CPT | Performed by: OBSTETRICS & GYNECOLOGY

## 2024-11-04 PROCEDURE — NC001 PR NO CHARGE: Performed by: OBSTETRICS & GYNECOLOGY

## 2024-11-04 PROCEDURE — 82805 BLOOD GASES W/O2 SATURATION: CPT | Performed by: OBSTETRICS & GYNECOLOGY

## 2024-11-04 PROCEDURE — 82948 REAGENT STRIP/BLOOD GLUCOSE: CPT

## 2024-11-04 PROCEDURE — 88307 TISSUE EXAM BY PATHOLOGIST: CPT | Performed by: PATHOLOGY

## 2024-11-04 PROCEDURE — 85027 COMPLETE CBC AUTOMATED: CPT | Performed by: OBSTETRICS & GYNECOLOGY

## 2024-11-04 RX ORDER — NALBUPHINE HYDROCHLORIDE 10 MG/ML
5 INJECTION INTRAMUSCULAR; INTRAVENOUS; SUBCUTANEOUS
Status: DISCONTINUED | OUTPATIENT
Start: 2024-11-04 | End: 2024-11-04

## 2024-11-04 RX ORDER — FENTANYL CITRATE 50 UG/ML
INJECTION, SOLUTION INTRAMUSCULAR; INTRAVENOUS AS NEEDED
Status: DISCONTINUED | OUTPATIENT
Start: 2024-11-04 | End: 2024-11-04

## 2024-11-04 RX ORDER — PANTOPRAZOLE SODIUM 20 MG/1
20 TABLET, DELAYED RELEASE ORAL
Status: DISCONTINUED | OUTPATIENT
Start: 2024-11-04 | End: 2024-11-07 | Stop reason: HOSPADM

## 2024-11-04 RX ORDER — DOCUSATE SODIUM 100 MG/1
100 CAPSULE, LIQUID FILLED ORAL 2 TIMES DAILY
Status: DISCONTINUED | OUTPATIENT
Start: 2024-11-04 | End: 2024-11-07 | Stop reason: HOSPADM

## 2024-11-04 RX ORDER — BUPIVACAINE HYDROCHLORIDE 7.5 MG/ML
INJECTION, SOLUTION INTRASPINAL AS NEEDED
Status: DISCONTINUED | OUTPATIENT
Start: 2024-11-04 | End: 2024-11-04

## 2024-11-04 RX ORDER — SODIUM CHLORIDE, SODIUM LACTATE, POTASSIUM CHLORIDE, CALCIUM CHLORIDE 600; 310; 30; 20 MG/100ML; MG/100ML; MG/100ML; MG/100ML
75 INJECTION, SOLUTION INTRAVENOUS CONTINUOUS
Status: DISCONTINUED | OUTPATIENT
Start: 2024-11-04 | End: 2024-11-07 | Stop reason: HOSPADM

## 2024-11-04 RX ORDER — SERTRALINE HYDROCHLORIDE 100 MG/1
100 TABLET, FILM COATED ORAL
Status: DISCONTINUED | OUTPATIENT
Start: 2024-11-04 | End: 2024-11-04

## 2024-11-04 RX ORDER — KETOROLAC TROMETHAMINE 30 MG/ML
30 INJECTION, SOLUTION INTRAMUSCULAR; INTRAVENOUS EVERY 6 HOURS
Status: COMPLETED | OUTPATIENT
Start: 2024-11-04 | End: 2024-11-05

## 2024-11-04 RX ORDER — CEFAZOLIN SODIUM 2 G/50ML
2000 SOLUTION INTRAVENOUS ONCE
Status: COMPLETED | OUTPATIENT
Start: 2024-11-04 | End: 2024-11-04

## 2024-11-04 RX ORDER — MORPHINE SULFATE 0.5 MG/ML
INJECTION, SOLUTION EPIDURAL; INTRATHECAL; INTRAVENOUS AS NEEDED
Status: DISCONTINUED | OUTPATIENT
Start: 2024-11-04 | End: 2024-11-04

## 2024-11-04 RX ORDER — LAMOTRIGINE 25 MG/1
100 TABLET ORAL DAILY
Status: DISCONTINUED | OUTPATIENT
Start: 2024-11-04 | End: 2024-11-07 | Stop reason: HOSPADM

## 2024-11-04 RX ORDER — NIFEDIPINE 30 MG/1
30 TABLET, EXTENDED RELEASE ORAL EVERY 24 HOURS
Status: DISCONTINUED | OUTPATIENT
Start: 2024-11-04 | End: 2024-11-05

## 2024-11-04 RX ORDER — MAGNESIUM SULFATE HEPTAHYDRATE 40 MG/ML
2 INJECTION, SOLUTION INTRAVENOUS CONTINUOUS
Status: DISPENSED | OUTPATIENT
Start: 2024-11-04 | End: 2024-11-05

## 2024-11-04 RX ORDER — MAGNESIUM SULFATE HEPTAHYDRATE 40 MG/ML
2 INJECTION, SOLUTION INTRAVENOUS ONCE
Status: COMPLETED | OUTPATIENT
Start: 2024-11-04 | End: 2024-11-04

## 2024-11-04 RX ORDER — KETOROLAC TROMETHAMINE 30 MG/ML
INJECTION, SOLUTION INTRAMUSCULAR; INTRAVENOUS AS NEEDED
Status: DISCONTINUED | OUTPATIENT
Start: 2024-11-04 | End: 2024-11-04

## 2024-11-04 RX ORDER — SERTRALINE HYDROCHLORIDE 100 MG/1
100 TABLET, FILM COATED ORAL DAILY
Status: DISCONTINUED | OUTPATIENT
Start: 2024-11-04 | End: 2024-11-07 | Stop reason: HOSPADM

## 2024-11-04 RX ORDER — ONDANSETRON 2 MG/ML
INJECTION INTRAMUSCULAR; INTRAVENOUS AS NEEDED
Status: DISCONTINUED | OUTPATIENT
Start: 2024-11-04 | End: 2024-11-04

## 2024-11-04 RX ORDER — OXYCODONE HYDROCHLORIDE 5 MG/1
5 TABLET ORAL EVERY 4 HOURS PRN
Status: DISCONTINUED | OUTPATIENT
Start: 2024-11-05 | End: 2024-11-07 | Stop reason: HOSPADM

## 2024-11-04 RX ORDER — ONDANSETRON 2 MG/ML
4 INJECTION INTRAMUSCULAR; INTRAVENOUS EVERY 8 HOURS PRN
Status: DISCONTINUED | OUTPATIENT
Start: 2024-11-04 | End: 2024-11-07 | Stop reason: HOSPADM

## 2024-11-04 RX ORDER — IBUPROFEN 600 MG/1
600 TABLET, FILM COATED ORAL EVERY 6 HOURS
Status: DISCONTINUED | OUTPATIENT
Start: 2024-11-06 | End: 2024-11-07 | Stop reason: HOSPADM

## 2024-11-04 RX ORDER — SIMETHICONE 80 MG
80 TABLET,CHEWABLE ORAL 4 TIMES DAILY PRN
Status: DISCONTINUED | OUTPATIENT
Start: 2024-11-04 | End: 2024-11-07 | Stop reason: HOSPADM

## 2024-11-04 RX ORDER — ONDANSETRON 2 MG/ML
4 INJECTION INTRAMUSCULAR; INTRAVENOUS ONCE
Status: DISCONTINUED | OUTPATIENT
Start: 2024-11-04 | End: 2024-11-04

## 2024-11-04 RX ORDER — DEXAMETHASONE SODIUM PHOSPHATE 10 MG/ML
INJECTION, SOLUTION INTRAMUSCULAR; INTRAVENOUS AS NEEDED
Status: DISCONTINUED | OUTPATIENT
Start: 2024-11-04 | End: 2024-11-04

## 2024-11-04 RX ORDER — ACETAMINOPHEN 325 MG/1
650 TABLET ORAL EVERY 6 HOURS SCHEDULED
Status: COMPLETED | OUTPATIENT
Start: 2024-11-04 | End: 2024-11-07

## 2024-11-04 RX ORDER — LABETALOL HYDROCHLORIDE 5 MG/ML
20 INJECTION, SOLUTION INTRAVENOUS ONCE
Status: DISCONTINUED | OUTPATIENT
Start: 2024-11-04 | End: 2024-11-04

## 2024-11-04 RX ORDER — EPHEDRINE SULFATE 50 MG/ML
INJECTION INTRAVENOUS AS NEEDED
Status: DISCONTINUED | OUTPATIENT
Start: 2024-11-04 | End: 2024-11-04

## 2024-11-04 RX ORDER — CEFAZOLIN SODIUM 2 G/50ML
SOLUTION INTRAVENOUS AS NEEDED
Status: DISCONTINUED | OUTPATIENT
Start: 2024-11-04 | End: 2024-11-04

## 2024-11-04 RX ORDER — OXYTOCIN/RINGER'S LACTATE 30/500 ML
PLASTIC BAG, INJECTION (ML) INTRAVENOUS CONTINUOUS PRN
Status: DISCONTINUED | OUTPATIENT
Start: 2024-11-04 | End: 2024-11-04

## 2024-11-04 RX ORDER — OMEGA-3/DHA/EPA/FISH OIL 60 MG-90MG
500 CAPSULE ORAL DAILY
Status: DISCONTINUED | OUTPATIENT
Start: 2024-11-04 | End: 2024-11-05

## 2024-11-04 RX ORDER — DIPHENHYDRAMINE HYDROCHLORIDE 50 MG/ML
25 INJECTION INTRAMUSCULAR; INTRAVENOUS EVERY 6 HOURS PRN
Status: DISCONTINUED | OUTPATIENT
Start: 2024-11-04 | End: 2024-11-07 | Stop reason: HOSPADM

## 2024-11-04 RX ORDER — MAGNESIUM SULFATE HEPTAHYDRATE 40 MG/ML
4 INJECTION, SOLUTION INTRAVENOUS ONCE
Status: COMPLETED | OUTPATIENT
Start: 2024-11-04 | End: 2024-11-04

## 2024-11-04 RX ORDER — NALOXONE HYDROCHLORIDE 0.4 MG/ML
0.1 INJECTION, SOLUTION INTRAMUSCULAR; INTRAVENOUS; SUBCUTANEOUS
Status: DISCONTINUED | OUTPATIENT
Start: 2024-11-04 | End: 2024-11-04

## 2024-11-04 RX ORDER — FENTANYL CITRATE/PF 50 MCG/ML
25 SYRINGE (ML) INJECTION
Status: DISCONTINUED | OUTPATIENT
Start: 2024-11-04 | End: 2024-11-04

## 2024-11-04 RX ADMIN — MAGNESIUM SULFATE HEPTAHYDRATE 2 G: 40 INJECTION, SOLUTION INTRAVENOUS at 20:27

## 2024-11-04 RX ADMIN — SERTRALINE 100 MG: 100 TABLET, FILM COATED ORAL at 12:58

## 2024-11-04 RX ADMIN — PANTOPRAZOLE SODIUM 20 MG: 20 TABLET, DELAYED RELEASE ORAL at 12:45

## 2024-11-04 RX ADMIN — PHENYLEPHRINE HYDROCHLORIDE 50 MCG/MIN: 10 INJECTION, SOLUTION INTRAVENOUS at 07:41

## 2024-11-04 RX ADMIN — ACETAMINOPHEN 650 MG: 325 TABLET, FILM COATED ORAL at 18:01

## 2024-11-04 RX ADMIN — KETOROLAC TROMETHAMINE 30 MG: 30 INJECTION, SOLUTION INTRAMUSCULAR; INTRAVENOUS at 08:16

## 2024-11-04 RX ADMIN — NIFEDIPINE 30 MG: 30 TABLET, FILM COATED, EXTENDED RELEASE ORAL at 20:16

## 2024-11-04 RX ADMIN — LAMOTRIGINE 100 MG: 25 TABLET ORAL at 12:45

## 2024-11-04 RX ADMIN — CEFAZOLIN SODIUM 2000 MG: 2 SOLUTION INTRAVENOUS at 07:40

## 2024-11-04 RX ADMIN — SODIUM CHLORIDE, SODIUM LACTATE, POTASSIUM CHLORIDE, AND CALCIUM CHLORIDE 125 ML/HR: .6; .31; .03; .02 INJECTION, SOLUTION INTRAVENOUS at 02:20

## 2024-11-04 RX ADMIN — ONDANSETRON 4 MG: 2 INJECTION, SOLUTION INTRAMUSCULAR; INTRAVENOUS at 07:43

## 2024-11-04 RX ADMIN — ACETAMINOPHEN 650 MG: 325 TABLET, FILM COATED ORAL at 12:45

## 2024-11-04 RX ADMIN — DEXAMETHASONE SODIUM PHOSPHATE 10 MG: 10 INJECTION, SOLUTION INTRAMUSCULAR; INTRAVENOUS at 07:43

## 2024-11-04 RX ADMIN — Medication 250 MILLI-UNITS/MIN: at 08:08

## 2024-11-04 RX ADMIN — SODIUM CHLORIDE, SODIUM LACTATE, POTASSIUM CHLORIDE, AND CALCIUM CHLORIDE: .6; .31; .03; .02 INJECTION, SOLUTION INTRAVENOUS at 08:45

## 2024-11-04 RX ADMIN — CEFAZOLIN SODIUM 2000 MG: 2 SOLUTION INTRAVENOUS at 07:30

## 2024-11-04 RX ADMIN — KETOROLAC TROMETHAMINE 30 MG: 30 INJECTION, SOLUTION INTRAMUSCULAR; INTRAVENOUS at 14:25

## 2024-11-04 RX ADMIN — MORPHINE SULFATE 0.2 MG: 0.5 INJECTION EPIDURAL; INTRATHECAL; INTRAVENOUS at 07:40

## 2024-11-04 RX ADMIN — EPHEDRINE SULFATE 5 MG: 50 INJECTION INTRAVENOUS at 08:37

## 2024-11-04 RX ADMIN — MAGNESIUM SULFATE HEPTAHYDRATE 4 G: 40 INJECTION, SOLUTION INTRAVENOUS at 20:00

## 2024-11-04 RX ADMIN — AZITHROMYCIN MONOHYDRATE 500 MG: 500 INJECTION, POWDER, LYOPHILIZED, FOR SOLUTION INTRAVENOUS at 07:39

## 2024-11-04 RX ADMIN — BUPIVACAINE HYDROCHLORIDE IN DEXTROSE 1.6 ML: 7.5 INJECTION, SOLUTION SUBARACHNOID at 07:39

## 2024-11-04 RX ADMIN — SODIUM CHLORIDE, SODIUM LACTATE, POTASSIUM CHLORIDE, AND CALCIUM CHLORIDE 75 ML/HR: .6; .31; .03; .02 INJECTION, SOLUTION INTRAVENOUS at 20:20

## 2024-11-04 RX ADMIN — KETOROLAC TROMETHAMINE 30 MG: 30 INJECTION, SOLUTION INTRAMUSCULAR; INTRAVENOUS at 20:16

## 2024-11-04 RX ADMIN — DOCUSATE SODIUM 100 MG: 100 CAPSULE, LIQUID FILLED ORAL at 12:45

## 2024-11-04 RX ADMIN — MAGNESIUM SULFATE HEPTAHYDRATE 2 G/HR: 40 INJECTION, SOLUTION INTRAVENOUS at 20:38

## 2024-11-04 RX ADMIN — EPHEDRINE SULFATE 10 MG: 50 INJECTION INTRAVENOUS at 08:22

## 2024-11-04 RX ADMIN — FENTANYL CITRATE 20 MCG: 50 INJECTION, SOLUTION INTRAMUSCULAR; INTRAVENOUS at 07:39

## 2024-11-04 NOTE — ANESTHESIA POSTPROCEDURE EVALUATION
Post-Op Assessment Note    CV Status:  Stable    Pain management: adequate       Mental Status:  Alert and awake   Hydration Status:  Euvolemic and stable   PONV Controlled:  None   Airway Patency:  Patent     Post Op Vitals Reviewed: Yes    No anethesia notable event occurred.    Staff: Anesthesiologist           Last Filed PACU Vitals:  Vitals Value Taken Time   Temp 97.4 °F (36.3 °C) 11/04/24 0915   Pulse 68 11/04/24 1045   /68 11/04/24 1045   Resp 16 11/04/24 1045   SpO2 98 % 11/04/24 1045       Modified Tessa:  Activity: 1 (11/4/2024  9:55 AM)  Respiration: 2 (11/4/2024  9:55 AM)  Circulation: 2 (11/4/2024  9:55 AM)  Consciousness: 2 (11/4/2024  9:55 AM)  Oxygen Saturation: 2 (11/4/2024  9:55 AM)  Modified Tessa Score: 9 (11/4/2024  9:55 AM)

## 2024-11-04 NOTE — ANESTHESIA PREPROCEDURE EVALUATION
Procedure:   SECTION () REPEAT (Uterus)    Relevant Problems   CARDIO   (+) Migraines   (+) Preeclampsia, third trimester      GI/HEPATIC   (+) GERD (gastroesophageal reflux disease)      GYN   (+) Multigravida of advanced maternal age in first trimester      NEURO/PSYCH   (+) Generalized anxiety disorder   (+) Migraines   (+) PTSD (post-traumatic stress disorder)      Behavioral Health   (+) OCD (obsessive compulsive disorder)      Obstetrics/Gynecology   (+) Obesity complicating pregnancy, childbirth, or puerperium, antepartum      Care Coordination   (+) Maternal care due to low transverse uterine scar from previous  delivery        Physical Exam    Airway    Mallampati score: II  TM Distance: >3 FB  Neck ROM: full     Dental   No notable dental hx     Cardiovascular      Pulmonary      Other Findings  post-pubertal.      Anesthesia Plan  ASA Score- 2     Anesthesia Type- spinal with ASA Monitors.         Additional Monitors:     Airway Plan:     Comment: duramorph.       Plan Factors-    Chart reviewed.   Existing labs reviewed. Patient summary reviewed.    Patient is not a current smoker.              Induction-     Postoperative Plan- Plan for postoperative opioid use.     Perioperative Resuscitation Plan - Level 1 - Full Code.       Informed Consent- Anesthetic plan and risks discussed with patient and spouse.  I personally reviewed this patient with the CRNA. Discussed and agreed on the Anesthesia Plan with the CRNA..

## 2024-11-04 NOTE — ANESTHESIA PROCEDURE NOTES
Spinal Block    Patient location during procedure: OR  Start time: 11/4/2024 7:38 AM  Staffing  Performed by: Yvonne Esquivel CRNA  Authorized by: Manjit Davila DO    Preanesthetic Checklist  Completed: patient identified, IV checked, site marked, risks and benefits discussed, surgical consent, monitors and equipment checked, pre-op evaluation and timeout performed  Spinal Block  Patient position: sitting  Prep: ChloraPrep  Patient monitoring: heart rate, cardiac monitor, continuous pulse ox and frequent blood pressure checks  Approach: midline  Location: L3-4  Needle  Needle type: Pencil Point   Needle gauge: 24 G  Needle length: 4 in  Assessment  Sensory level: T4  Injection Assessment:  negative aspiration for heme, no paresthesia on injection and positive aspiration for clear CSF.  Post-procedure:  site cleaned

## 2024-11-04 NOTE — OP NOTE
OPERATIVE REPORT  PATIENT NAME: Manju Khalil    :  1985  MRN: [id number]  Pt Location: [facility] L[facility]D OR ROOM 02    SURGERY DATE: 2024    Surgeons and Role:    * JAMES Woods MD - Primary    * Kip Cardona MD - assist.     Preop Diagnosis:  SIUP @ 39.5wks by early 1st trimester dating  Limited prenatal care  Undiagnosed gestational diabetes with polyhydramnios and suspected macrosomia  Rh-, did not receive RhoGAM in pregnancy  GBS unknown, asymptomatic  History of prior         Procedure(s) (LRB):   SECTION () REPEAT (N/A)    Specimen(s):  ID Type Source Tests Collected by Time Destination   1 :  Tissue (Placenta on Hold) OB Only Placenta TISSUE EXAM OB (PLACENTA) ONLY JAMES Woods MD 2024 0753    A :  Cord Blood Umbilical cord  JAMES Woods MD 2024 075        Surgical QBL:  Surgical QBL (mL): 480 mL  (EBL~700cc)    IV fluids: 1600 cc    Urine output: 300 cc clear yellow urine      Drains:  Urethral Catheter Non-latex (Active)   Site Assessment Clean;Skin intact 24 0925   Rubio Care Done 24 0925   Collection Container Standard drainage bag 24 1005   Securement Method Securing device (Describe) 24 1005   Output (mL) 350 mL 24 0925   Number of days: 0       Anesthesia Type:   Epidural    Operative Indications:  Limited prenatal care  Undiagnosed gestational diabetes with suspected macrosomia  Polyhydramnios  Meconium  Preeclampsia without severe features       Operative Findings:  Viable male infant weighing 8 pounds 7.8 ounces, normal-appearing bilateral tubes and ovaries.  Normal bladder.  Mild scar tissue and underlying subcutaneous tissue and omentum to fascia.    Complications:   None    Procedure and Technique:   Procedure:   Section    Indications:     Manju Khalil  is a 38 y.o.   who presented for labor symptoms with noted 1 elevated blood pressure in ER.  Informed consent was obtained after all  risks/benefits/alternatives were discussed with the patient and all questions, concerns, and issues were addressed to patient's satisfaction.  Pt agreed and consented to proceed with the listed procedure above.  Given the limited prenatal care and undiagnosed gestational diabetes with suspected macrosomia and polyhydramnios, extensive counseling regarding shoulder dystocia was addressed.  Recommendations for  delivery secondary to limited prenatal care.        Details of the Procedure:    After informed consent was verified, patient was brought to the operating room where she was identified by her name and birthdate.  A timeout procedure in which all team members present were in agreement was performed.  An epidural anesthesia was then introduced.  Once anesthesia was adequate, a godinez catheter was inserted, noting clear yellow urine flowing in the catheter tubing.  Vagina was prepped with Betadine prep.  Bilateral SCDs were also placed and noted to be functional.  She was secured to the operating table with a lap belt.  The Bovie ground pad was placed on her thigh.  Fetal heart tones were noted to be 130s bpm.  She was then prepped and draped in normal sterile fashion and a second timeout procedure was then performed with all team members present in agreement.    No qualified surgical resident was available to assist in the case. The assistance of an additional surgeon was critical for completion of the case. The assistant surgeon provided assistance with exposure, hemostasis, delivery of the infant, tissue manipulation, and suturing. The assistant surgeon was present from the start of the procedure until fascial closure. I, the primary surgeon, was present and scrubbed throughout the entirety of the case and performed all key portions of the surgical procedure.     A Pfannenstiel skin incision was then made over her previous incision, approximately 2 fingerbreadths above the pubic symphysis.  This  incision was then carried down through the underlying fascia and the fascia was then nicked at midline.  The fascial incision was then extended laterally with Brambila scissors, bilaterally.  The superior aspect of the fascia was then grasped with Kocher clamps, tented up and the underlying rectus muscles were then dissected down carefully.  In a similar fashion the inferior aspect of the fascia was then grasped with Kocher clamps, tented up and the underlying rectus were then dissected down.  The rectus muscles were then  at midline using blunt dissection with hemostats.   The peritoneum was identified and entered sharply using tenting and Metzenbaum scissors.  The peritoneum was then stretched laterally, manually, noting position of the bladder.  The bladder blade was then placed and to tag wet lap sponges were then placed in the gutters bilaterally.  A bladder flap was not made as the position of the bladder reflection was clearly noted.   A new scalpel  was then used to perform the hysterotomy in the lower uterine segment in a layer by layer fashion,  in a low transverse fashion once the amniotic sac was identified, the fetal vertex was then brought to the hysterotomy site and using fundal pressure the rest of the infant's body was then delivered atraumatically without excessive pulling or tugging of the fetal head.  A significant amount of meconium stained amniotic fluid was noted.  The nasopharynx was immediately bulb suction noting a crying and vigorous infant.  A nuchal cord was not noted.  30 seconds of delayed cord clamping was performed.  Once this time had elapsed, the cord was then doubly clamped and cut, and the infant was handed off to the waiting NICU staff.  A section of the cord was then obtained for blood gases. Cord blood was also obtained.  The placenta was then manually delivered noting intact placenta.  The uterus was then exteriorized and cleared of all clot and debris.  The hysterotomy  was then repaired in 2 layers with the first layer using 0- strata fix suture in a second imbricating layer with 0-Monocryl suture.  Excellent hemostasis was noted at the hysterotomy site.  The posterior cul-de-sac was then copiously irrigated and cleared of all clot and debris.  The uterus was then replaced into the abdominal cavity and the gutters sponges were then removed clearing the gutters of all clot and debris.  The bladder blade was then replaced.  The peritoneum was then reapproximated using 2-0 plain gut suture and the muscles were also reapproximated using 2-0 plain gut suture in several horizontal mattress sutures.  The fascia was then reapproximated using 0 Vicryl suture in a running fashion.  The subcutaneous tissue was then copiously irrigated and reapproximated using 2-0 plain gut suture in 3 layers.  The skin incision was then reapproximated using 3-0 Monocryl on a Juan Daniel needle in a subcuticular fashion.  The skin incision was then cleaned.  A Mepilex dressing was applied over the incision.  No bladder, bowel, or ureteral injuries were noted at the end of the case.      A vaginal exam was then performed and the uterus was expressed, clearing the vagina, cervix, and lower uterine segment of any clots and debris.  All sponge, laps, and needle counts were correct x2.  The patient received 2 g Ancef and 500 mg of azithromycin antibiotics prior to onset of the procedure.  The patient was then transferred to PACU for further recovery from anesthesia.  The patient and infant were left in stable condition respectively.        Patient Disposition:  PACU         R Nesha Woods MD, FACOG  11/4/2024  2:22 PM

## 2024-11-04 NOTE — OB LABOR/OXYTOCIN SAFETY PROGRESS
Labor Progress Note - Manju Khalil 38 y.o. female MRN: [id number]    Unit/Bed#: -01 Encounter: [id number]       Contraction Frequency (minutes): 3-7  Contraction Intensity: Mild  Uterine Activity Characteristics: Regular  Cervical Dilation: 4-5        Cervical Effacement: 70  Fetal Station: -3  Baseline Rate (FHR): 130 bpm  Fetal Heart Rate (FHT): 145 BPM  FHR Category: 1         EFW= 4300g      Vital Signs: 138-141/85-98  Vitals:    24 0300   BP:    Pulse:    Resp:    Temp: 98.2 °F (36.8 °C)   SpO2:        Notes/comments:   SIUP with limited prenatal care due to insurance coverage.   PreE based on PCR, Bps and LFTs. No severe features. For repeat CBC/CMP.   Largest baby  was 8#.   A neg:  did NOT get RhoGAM in this pregnancy.  Not sure of FOB's blood type.   Anemia in pregnancy.  GBS unknown:  no sxs.  Will not treat at this point.   Meconium:  for NICU @ time of delivery.   H/o c/s @ G1 for breech.     hopeful    R Nesha Woods MD, FACOG  2024 6:51 AM

## 2024-11-04 NOTE — OB LABOR/OXYTOCIN SAFETY PROGRESS
Labor Progress Note - Manju Khalil 38 y.o. female MRN: [id number]    Unit/Bed#: -01 Encounter: [id number]       Contraction Frequency (minutes): 3-4  Contraction Intensity: Mild  Uterine Activity Characteristics: Regular  Cervical Dilation: 4        Cervical Effacement: 70  Fetal Station: -3  Baseline Rate (FHR): 135 bpm  Fetal Heart Rate (FHT): 145 BPM  FHR Category: 1               Vital Signs:   Vitals:    11/03/24 2022   BP: 141/85   Pulse: 96   Resp:    Temp: 98 °F (36.7 °C)   SpO2:        Notes/comments:   Making change. Continue expectant management.  Meets criteria for pre-e without severe features. Monitor BP.     Deena Mason MD 11/4/2024 1:00 AM

## 2024-11-04 NOTE — ANESTHESIA POSTPROCEDURE EVALUATION
Post-Op Assessment Note    CV Status:  Stable    Pain management: adequate       Mental Status:  Alert and awake   Hydration Status:  Euvolemic   PONV Controlled:  Controlled   Airway Patency:  Patent     Post Op Vitals Reviewed: Yes    No anethesia notable event occurred.    Staff: CRNA           Last Filed PACU Vitals:  Vitals Value Taken Time   Temp 97.4    Pulse 87    /100    Resp 18    SpO2 98        Modified Tessa:  No data recorded

## 2024-11-04 NOTE — CASE MANAGEMENT
Case Management Progress Note    Patient name Manju Khalil  Location /-01 MRN [id number]  : 1985 Date 2024       LOS (days): 1  Geometric Mean LOS (GMLOS) (days):   Days to GMLOS:        OBJECTIVE:        Current admission status: Inpatient  Preferred Pharmacy:   [facility]/pharmacy #[id number] - [place] - [address]  [address]  [place] [address]  Phone: [phone] Fax: [phone]     [facility] [place] - [address]  [address] [facility] [address]  Phone: [phone] Fax: [phone]    Primary Care Provider: Bobby Loo DO    Primary Insurance:   Secondary Insurance:     PROGRESS NOTE: Cm met with MOB and FOB regarding consult placed on baby's chart for lack of prenatal care after 12 weeks. Cm spoke with MOB's nurse prior to meeting with MOB and it is understood that MOB's insurance had been termed during her pregnancy after she was let go from her employment. She is in the process of getting state insurance. Cm met with MOB and FOB and confirmed this information. MOB states that she has completed the paperwork for [employer] [facility] and is waiting for it to be active. Cm offered assistance with any concerns. MOB and FOB did not indicate any concerns at this time. They [facility] and are aware to make that appointment. Cm available for further consult as needed.

## 2024-11-04 NOTE — OB LABOR/OXYTOCIN SAFETY PROGRESS
Labor Progress Note - Manju Khalil 38 y.o. female MRN: [id number]    Unit/Bed#: -01 Encounter: [id number]       Contraction Frequency (minutes): 3-7  Contraction Intensity: Mild  Uterine Activity Characteristics: Regular  Cervical Dilation: 4-5        Cervical Effacement: 70  Fetal Station: -3  Baseline Rate (FHR): 130 bpm  Fetal Heart Rate (FHT): 145 BPM  FHR Category: 1           Vital Signs: 1 severe range BP upon ER arrival, 140s/78-85 since.   Vitals:    11/04/24 0300   BP:    Pulse:    Resp:    Temp: 98.2 °F (36.8 °C)   SpO2:        Notes/comments:   SIUP with limited PNC  A1c of 6.5% just resulted with polyhydramnios, undiagnosed gDM.  EFW ~4300-4500g.  Discussed risk of shoulder dystocia given gDM, limited PNC, polyhydramnios.    A neg:  for RhoGAM.  Anemia in pregnancy:  risk of blood transfusion.   Discussed mode of delivery with new labs resulted.  Recommend for c/s delivery.    NICU and anesthesia aware.       R Nesha Woods MD, FACOG   11/4/2024 6:50 AM

## 2024-11-04 NOTE — PLAN OF CARE
Problem: BIRTH - VAGINAL/ SECTION  Goal: Fetal and maternal status remain reassuring during the birth process  Description: INTERVENTIONS:  - Monitor vital signs  - Monitor fetal heart rate  - Monitor uterine activity  - Monitor labor progression (vaginal delivery)  - DVT prophylaxis  - Antibiotic prophylaxis  Outcome: Progressing  Goal: Emotionally satisfying birthing experience for mother/fetus  Description: Interventions:  - Assess, plan, implement and evaluate the nursing care given to the patient in labor  - Advocate the philosophy that each childbirth experience is a unique experience and support the family's chosen level of involvement and control during the labor process   - Actively participate in both the patient's and family's teaching of the birth process  - Consider cultural, Adventist and age-specific factors and plan care for the patient in labor  Outcome: Progressing     Problem: PAIN - ADULT  Goal: Verbalizes/displays adequate comfort level or baseline comfort level  Description: Interventions:  - Encourage patient to monitor pain and request assistance  - Assess pain using appropriate pain scale  - Administer analgesics based on type and severity of pain and evaluate response  - Implement non-pharmacological measures as appropriate and evaluate response  - Consider cultural and social influences on pain and pain management  - Notify physician/advanced practitioner if interventions unsuccessful or patient reports new pain  Outcome: Progressing     Problem: INFECTION - ADULT  Goal: Absence or prevention of progression during hospitalization  Description: INTERVENTIONS:  - Assess and monitor for signs and symptoms of infection  - Monitor lab/diagnostic results  - Monitor all insertion sites, i.e. indwelling lines, tubes, and drains  - Monitor endotracheal if appropriate and nasal secretions for changes in amount and color  - [place] appropriate cooling/warming therapies per order  -  Administer medications as ordered  - Instruct and encourage patient and family to use good hand hygiene technique  - Identify and instruct in appropriate isolation precautions for identified infection/condition  Outcome: Progressing  Goal: Absence of fever/infection during neutropenic period  Description: INTERVENTIONS:  - Monitor WBC    Outcome: Progressing     Problem: SAFETY ADULT  Goal: Patient will remain free of falls  Description: INTERVENTIONS:  - Educate patient/family on patient safety including physical limitations  - Instruct patient to call for assistance with activity   - Consult OT/PT to assist with strengthening/mobility   - Keep Call bell within reach  - Keep bed low and locked with side rails adjusted as appropriate  - Keep care items and personal belongings within reach  - Initiate and maintain comfort rounds  - Make Fall Risk Sign visible to staff  - Offer Toileting every  Hours, in advance of need  - Initiate/Maintain alarm  - Obtain necessary fall risk management equipment:   - Apply yellow socks and bracelet for high fall risk patients  - Consider moving patient to room near nurses station  Outcome: Progressing  Goal: Maintain or return to baseline ADL function  Description: INTERVENTIONS:  -  Assess patient's ability to carry out ADLs; assess patient's baseline for ADL function and identify physical deficits which impact ability to perform ADLs (bathing, care of mouth/teeth, toileting, grooming, dressing, etc.)  - Assess/evaluate cause of self-care deficits   - Assess range of motion  - Assess patient's mobility; develop plan if impaired  - Assess patient's need for assistive devices and provide as appropriate  - Encourage maximum independence but intervene and supervise when necessary  - Involve family in performance of ADLs  - Assess for home care needs following discharge   - Consider OT consult to assist with ADL evaluation and planning for discharge  - Provide patient education as  appropriate  Outcome: Progressing  Goal: Maintains/Returns to pre admission functional level  Description: INTERVENTIONS:  - Perform AM-PAC 6 Click Basic Mobility/ Daily Activity assessment daily.  - Set and communicate daily mobility goal to care team and patient/family/caregiver.   - Collaborate with rehabilitation services on mobility goals if consulted  - Perform Range of Motion  times a day.  - Reposition patient every  hours.  - Dangle patient  times a day  - Stand patient  times a day  - Ambulate patient  times a day  - Out of bed to chair  times a day   - Out of bed for meals times a day  - Out of bed for toileting  - Record patient progress and toleration of activity level   Outcome: Progressing     Problem: Knowledge Deficit  Goal: Patient/family/caregiver demonstrates understanding of disease process, treatment plan, medications, and discharge instructions  Description: Complete learning assessment and assess knowledge base.  Interventions:  - Provide teaching at level of understanding  - Provide teaching via preferred learning methods  Outcome: Progressing     Problem: DISCHARGE PLANNING  Goal: Discharge to home or other facility with appropriate resources  Description: INTERVENTIONS:  - Identify barriers to discharge w/patient and caregiver  - Arrange for needed discharge resources and transportation as appropriate  - Identify discharge learning needs (meds, wound care, etc.)  - Arrange for interpretive services to assist at discharge as needed  - Refer to Case Management Department for coordinating discharge planning if the patient needs post-hospital services based on physician/advanced practitioner order or complex needs related to functional status, cognitive ability, or social support system  Outcome: Progressing

## 2024-11-04 NOTE — H&P
History & Physical - OB/GYN   Manju Khalil 38 y.o. female MRN: [id number]  Unit/Bed#:  TRIAGE 4 Encounter: [id number]    38 y.o. yo  at 39w4d weeks presents with complaint with contractions.     She had /116. Repeat /98. 141/85.    She is a patient of [facility]. She has not gotten prenatal care since 12 weeks.    HPI:  Contractions:  yes  Fetal movement:  yes  Vaginal bleeding:   no  Leaking of fluid:  no    Pregnancy Complications:  Patient Active Problem List   Diagnosis    GERD (gastroesophageal reflux disease)    Migraines    Irritable bowel syndrome with diarrhea    Generalized anxiety disorder    Carpal tunnel syndrome of right wrist    PTSD (post-traumatic stress disorder)    OCD (obsessive compulsive disorder)    12 weeks gestation of pregnancy    Multigravida of advanced maternal age in first trimester    Maternal care due to low transverse uterine scar from previous  delivery    Obesity complicating pregnancy, childbirth, or puerperium, antepartum     PMH:  Past Medical History:   Diagnosis Date    Anxiety     Follows Dr. Barrera -currently on Zoloft and Lamictal    Carpal tunnel syndrome     GERD (gastroesophageal reflux disease)     Irritable bowel syndrome (IBS)     Migraines     with Aura    PTSD (post-traumatic stress disorder)     Follows Dr. Barrera ([facility] psychiatry) currently on Zoloft and Lamictal     PSH:  Past Surgical History:   Procedure Laterality Date     SECTION      SINUS SURGERY      TONSILLECTOMY       Social Hx:  Social History     Tobacco Use    Smoking status: Never    Smokeless tobacco: Never   Vaping Use    Vaping status: Never Used   Substance Use Topics    Alcohol use: Not Currently     Comment: social    Drug use: Never      OB Hx:  OB History    Para Term  AB Living   4 3 2 1 0 3   SAB IAB Ectopic Multiple Live Births   0 0 0 0 3      # Outcome Date GA Lbr Addy/2nd Weight Sex Type Anes PTL Lv   4 Current            3   14 34w0d  3005 g (6 lb 10 oz) M Vag-Spont  Y CANDACE      Birth Comments: Sub chorionic hemorrhage early in pregnancy had  bleeding until 27 weeks, modified bedrest,  short term interval between pregnancies, 3d degree laceration   2 Term 10/02/12 40w0d  3629 g (8 lb) M  EPI N CANDACE      Birth Comments: , no complications, Second degreee laceration   1 Term 11 39w0d  3827 g (8 lb 7 oz) F CS-LTranv EPI N CANDACE      Birth Comments: Breech and Cholestasis in pregnacy     Meds:  No current facility-administered medications on file prior to encounter.     Current Outpatient Medications on File Prior to Encounter   Medication Sig Dispense Refill    aspirin 81 mg chewable tablet Chew 2 tablets (162 mg total) daily 180 tablet 1    lamoTRIgine (LaMICtal) 100 mg tablet Take 1 tablet (100 mg total) by mouth daily 90 tablet 1    Omega-3 Fatty Acids (Fish Oil) 500 MG CAPS Take 500 mg by mouth in the morning      omeprazole (PriLOSEC) 20 mg delayed release capsule Take 20 mg by mouth daily      Prenatal MV-Min-Fe Fum-FA-DHA (PRENATAL MULTIVITAMIN PLUS DHA PO) Take by mouth      sertraline (ZOLOFT) 100 mg tablet Take 1 tablet (100 mg total) by mouth daily at bedtime 90 tablet 1    diphenhydrAMINE HCl, Sleep, 50 MG CAPS Take 50 mg by mouth (Patient not taking: Reported on 2024)      melatonin 3 mg Take 3 mg by mouth daily at bedtime (Patient not taking: Reported on 4/15/2024)       Allergies:  Allergies   Allergen Reactions    Dust Mite Extract Hives    Pollen Extract-Tree Extract [Pollen Extract] Nasal Congestion     Physical Exam:  /85   Pulse 96   Temp 98.5 °F (36.9 °C) (Oral)   Resp 16   LMP 2024   SpO2 98%       Physical Exam  Constitutional:       Appearance: Normal appearance.   Genitourinary:      Vulva normal.   HENT:      Head: Normocephalic and atraumatic.   Pulmonary:      Effort: Pulmonary effort is normal.   Abdominal:      Palpations: Abdomen is soft. There is no mass.       Tenderness: There is no abdominal tenderness. There is no guarding or rebound.   Musculoskeletal:         General: Normal range of motion.   Neurological:      Mental Status: She is alert and oriented to person, place, and time.   Skin:     General: Skin is warm and dry.   Psychiatric:         Mood and Affect: Mood normal.         Behavior: Behavior normal.         Thought Content: Thought content normal.         Judgment: Judgment normal.     Estimated Fetal Weight: 9 lbs  Presentation: vertex by scan    SVE: 3 / 60% / -3  FHT:  130 / Moderate 6 - 25 bpm / +accels, no decels  [place]: q2-5min    Membranes: intact    Assessment:   38 y.o.  at 39w4d weeks with contractions, elevated BP, limited prenatal care.     Plan:   1. Admit to L&D  2. Will check pre-eclampsia labs and prenatal panel  3. Expectant management for now, will augment if indicated.   4. GBS unknown. No indications for ppx at this time.

## 2024-11-05 LAB
ALBUMIN SERPL BCG-MCNC: 2.9 G/DL (ref 3.5–5)
ALBUMIN SERPL BCG-MCNC: 3.4 G/DL (ref 3.5–5)
ALP SERPL-CCNC: 127 U/L (ref 34–104)
ALP SERPL-CCNC: 145 U/L (ref 34–104)
ALT SERPL W P-5'-P-CCNC: 40 U/L (ref 7–52)
ALT SERPL W P-5'-P-CCNC: 48 U/L (ref 7–52)
ANION GAP SERPL CALCULATED.3IONS-SCNC: 6 MMOL/L (ref 4–13)
ANION GAP SERPL CALCULATED.3IONS-SCNC: 8 MMOL/L (ref 4–13)
AST SERPL W P-5'-P-CCNC: 108 U/L (ref 13–39)
AST SERPL W P-5'-P-CCNC: 89 U/L (ref 13–39)
BILIRUB SERPL-MCNC: 0.19 MG/DL (ref 0.2–1)
BILIRUB SERPL-MCNC: 0.21 MG/DL (ref 0.2–1)
BUN SERPL-MCNC: 6 MG/DL (ref 5–25)
BUN SERPL-MCNC: 9 MG/DL (ref 5–25)
CALCIUM ALBUM COR SERPL-MCNC: 7.9 MG/DL (ref 8.3–10.1)
CALCIUM ALBUM COR SERPL-MCNC: 8.4 MG/DL (ref 8.3–10.1)
CALCIUM SERPL-MCNC: 7.4 MG/DL (ref 8.4–10.2)
CALCIUM SERPL-MCNC: 7.5 MG/DL (ref 8.4–10.2)
CHLORIDE SERPL-SCNC: 104 MMOL/L (ref 96–108)
CHLORIDE SERPL-SCNC: 105 MMOL/L (ref 96–108)
CO2 SERPL-SCNC: 22 MMOL/L (ref 21–32)
CO2 SERPL-SCNC: 22 MMOL/L (ref 21–32)
CREAT SERPL-MCNC: 0.62 MG/DL (ref 0.6–1.3)
CREAT SERPL-MCNC: 0.68 MG/DL (ref 0.6–1.3)
ERYTHROCYTE [DISTWIDTH] IN BLOOD BY AUTOMATED COUNT: 14.7 % (ref 11.6–15.1)
ERYTHROCYTE [DISTWIDTH] IN BLOOD BY AUTOMATED COUNT: 14.8 % (ref 11.6–15.1)
GFR SERPL CREATININE-BSD FRML MDRD: 111 ML/MIN/1.73SQ M
GFR SERPL CREATININE-BSD FRML MDRD: 114 ML/MIN/1.73SQ M
GLUCOSE SERPL-MCNC: 81 MG/DL (ref 65–140)
GLUCOSE SERPL-MCNC: 85 MG/DL (ref 65–140)
HCT VFR BLD AUTO: 23 % (ref 34.8–46.1)
HCT VFR BLD AUTO: 25.5 % (ref 34.8–46.1)
HGB BLD-MCNC: 7.1 G/DL (ref 11.5–15.4)
HGB BLD-MCNC: 7.9 G/DL (ref 11.5–15.4)
HIV 1+2 AB+HIV1 P24 AG SERPL QL IA: NORMAL
HIV 2 AB SERPL QL IA: NORMAL
HIV1 AB SERPL QL IA: NORMAL
HIV1 P24 AG SERPL QL IA: NORMAL
MAGNESIUM SERPL-MCNC: 4.7 MG/DL (ref 1.9–2.7)
MCH RBC QN AUTO: 23.5 PG (ref 26.8–34.3)
MCH RBC QN AUTO: 23.7 PG (ref 26.8–34.3)
MCHC RBC AUTO-ENTMCNC: 30.9 G/DL (ref 31.4–37.4)
MCHC RBC AUTO-ENTMCNC: 31 G/DL (ref 31.4–37.4)
MCV RBC AUTO: 76 FL (ref 82–98)
MCV RBC AUTO: 76 FL (ref 82–98)
PLATELET # BLD AUTO: 212 THOUSANDS/UL (ref 149–390)
PLATELET # BLD AUTO: 241 THOUSANDS/UL (ref 149–390)
PMV BLD AUTO: 10.2 FL (ref 8.9–12.7)
PMV BLD AUTO: 10.5 FL (ref 8.9–12.7)
POTASSIUM SERPL-SCNC: 4 MMOL/L (ref 3.5–5.3)
POTASSIUM SERPL-SCNC: 4.2 MMOL/L (ref 3.5–5.3)
PROT SERPL-MCNC: 5.5 G/DL (ref 6.4–8.4)
PROT SERPL-MCNC: 6.4 G/DL (ref 6.4–8.4)
RBC # BLD AUTO: 3.02 MILLION/UL (ref 3.81–5.12)
RBC # BLD AUTO: 3.34 MILLION/UL (ref 3.81–5.12)
SODIUM SERPL-SCNC: 132 MMOL/L (ref 135–147)
SODIUM SERPL-SCNC: 135 MMOL/L (ref 135–147)
WBC # BLD AUTO: 11.77 THOUSAND/UL (ref 4.31–10.16)
WBC # BLD AUTO: 12.46 THOUSAND/UL (ref 4.31–10.16)

## 2024-11-05 PROCEDURE — 85027 COMPLETE CBC AUTOMATED: CPT | Performed by: OBSTETRICS & GYNECOLOGY

## 2024-11-05 PROCEDURE — 80053 COMPREHEN METABOLIC PANEL: CPT | Performed by: OBSTETRICS & GYNECOLOGY

## 2024-11-05 PROCEDURE — 83735 ASSAY OF MAGNESIUM: CPT | Performed by: OBSTETRICS & GYNECOLOGY

## 2024-11-05 PROCEDURE — 99024 POSTOP FOLLOW-UP VISIT: CPT | Performed by: OBSTETRICS & GYNECOLOGY

## 2024-11-05 RX ORDER — FUROSEMIDE 20 MG/1
20 TABLET ORAL DAILY
Status: DISCONTINUED | OUTPATIENT
Start: 2024-11-05 | End: 2024-11-07 | Stop reason: HOSPADM

## 2024-11-05 RX ORDER — NIFEDIPINE 30 MG/1
60 TABLET, EXTENDED RELEASE ORAL EVERY 24 HOURS
Status: DISCONTINUED | OUTPATIENT
Start: 2024-11-05 | End: 2024-11-07 | Stop reason: HOSPADM

## 2024-11-05 RX ADMIN — KETOROLAC TROMETHAMINE 30 MG: 30 INJECTION, SOLUTION INTRAMUSCULAR; INTRAVENOUS at 20:16

## 2024-11-05 RX ADMIN — KETOROLAC TROMETHAMINE 30 MG: 30 INJECTION, SOLUTION INTRAMUSCULAR; INTRAVENOUS at 09:42

## 2024-11-05 RX ADMIN — ACETAMINOPHEN 650 MG: 325 TABLET, FILM COATED ORAL at 06:19

## 2024-11-05 RX ADMIN — PRENATAL VIT W/ FE FUMARATE-FA TAB 27-0.8 MG 1 TABLET: 27-0.8 TAB at 09:42

## 2024-11-05 RX ADMIN — MAGNESIUM SULFATE HEPTAHYDRATE 2 G/HR: 40 INJECTION, SOLUTION INTRAVENOUS at 16:17

## 2024-11-05 RX ADMIN — SERTRALINE 100 MG: 100 TABLET, FILM COATED ORAL at 09:42

## 2024-11-05 RX ADMIN — ACETAMINOPHEN 650 MG: 325 TABLET, FILM COATED ORAL at 12:09

## 2024-11-05 RX ADMIN — SODIUM CHLORIDE, SODIUM LACTATE, POTASSIUM CHLORIDE, AND CALCIUM CHLORIDE 75 ML/HR: .6; .31; .03; .02 INJECTION, SOLUTION INTRAVENOUS at 09:57

## 2024-11-05 RX ADMIN — ACETAMINOPHEN 650 MG: 325 TABLET, FILM COATED ORAL at 00:30

## 2024-11-05 RX ADMIN — FUROSEMIDE 20 MG: 20 TABLET ORAL at 15:44

## 2024-11-05 RX ADMIN — LAMOTRIGINE 100 MG: 25 TABLET ORAL at 09:42

## 2024-11-05 RX ADMIN — ACETAMINOPHEN 650 MG: 325 TABLET, FILM COATED ORAL at 17:07

## 2024-11-05 RX ADMIN — PANTOPRAZOLE SODIUM 20 MG: 20 TABLET, DELAYED RELEASE ORAL at 06:35

## 2024-11-05 RX ADMIN — DOCUSATE SODIUM 100 MG: 100 CAPSULE, LIQUID FILLED ORAL at 17:07

## 2024-11-05 RX ADMIN — DOCUSATE SODIUM 100 MG: 100 CAPSULE, LIQUID FILLED ORAL at 09:42

## 2024-11-05 RX ADMIN — KETOROLAC TROMETHAMINE 30 MG: 30 INJECTION, SOLUTION INTRAMUSCULAR; INTRAVENOUS at 02:15

## 2024-11-05 RX ADMIN — MAGNESIUM SULFATE HEPTAHYDRATE 2 G/HR: 40 INJECTION, SOLUTION INTRAVENOUS at 06:21

## 2024-11-05 RX ADMIN — KETOROLAC TROMETHAMINE 30 MG: 30 INJECTION, SOLUTION INTRAMUSCULAR; INTRAVENOUS at 14:43

## 2024-11-05 RX ADMIN — ACETAMINOPHEN 650 MG: 325 TABLET, FILM COATED ORAL at 23:13

## 2024-11-05 RX ADMIN — NIFEDIPINE 60 MG: 30 TABLET, FILM COATED, EXTENDED RELEASE ORAL at 20:17

## 2024-11-05 NOTE — CASE MANAGEMENT
Case Management Progress Note    Patient name Manju Khalil  Location /-01 MRN [id number]  : 1985 Date 2024       LOS (days): 2  Geometric Mean LOS (GMLOS) (days):   Days to GMLOS:        OBJECTIVE:        Current admission status: Inpatient  Preferred Pharmacy:   [facility]/pharmacy #[id number] - [place] - [address]  [address]  [place] [address]  Phone: [phone] Fax: [phone]     [facility] [place] - [address]  [address] [place] [address]  Phone: [phone] Fax: [phone]    Primary Care Provider: Bobby Loo DO    Primary Insurance:   Secondary Insurance:     PROGRESS NOTE:Cm consulted for pump for home use. Cm met with MOB's SO as she was sleeping at the time on the meeting. Cm explained that due to the lack of insurance and being Self pay CM is not able to process an order for a pump. Pt is in the process  of getting MA but is not [place] pending yet. Cm offered hand pump and informed nursing that a hand pump can be provided. Cm discussed with MOB's SO that once she has a member ID number and an insurance she can order a pump online. Cm provided information about ordering a pump online with insurance once it is obtained.

## 2024-11-05 NOTE — QUICK NOTE
Based on review of blood pressures today, will increase Procardia XL to 60 mg PO daily beginning this evening. Repeat CMP in AM, as previously planned in the setting of up-trending LFTs. Will also administer Lasix 20 mg PO daily x 5 days for diuresis.     Mateo Choi MD  11/5/2024 3:24 PM

## 2024-11-05 NOTE — LACTATION NOTE
"This note was copied from a baby's chart.  Met with mother to discuss feeding plan. Mother is planning to exclusively breast feed. Baby received supplement due to low blood sugar, but mother is planning to cease once sugar checks are complete. Encouraged hand expressing after feedings to provide expressed colostrum as supplement. Discussed importance of offering both breasts first before supplementing. Provided syringes and medication cups for milk collection that were left at the bedside.    Baby is currently at a 5.1% weight loss, having appropriate output for his age and 24 hour bilirubin will be checked soon.    The Ready, Set, Baby Booklet was discussed briefly and the Breastfeeding Discharge booklet was left at the bedside. Discussed importance of skin to skin to help baby awaken for breastfeeding, to help with milk production as well as stabilize temperature, blood sugars, decrease pain, promote relaxation, and calm the baby as well as for bonding that father may do as well. Discussed tummy size progression as milk production increases to meet the nutritional/growing needs of the baby. Discussed alternative feeding methods as a manner to provide baby with additional colostrum/breast milk if baby is sleepy and/or unable to breastfeed directly to help protect the milk supply and preserve latching abilities at the breast. Mother was encouraged to hand express after feedings to provide \"dessert\" and when sleepy to hand express to provide \"snacks\" which could help baby to awaken for a feeding.    Addressed breast pump needs and mother discussed that she would like the [employer] breast pump for home use. Case management consult will be placed.    She was made aware of how to communicate with lactation and encouraged to reach out for a latch assessment, continued support and/or questions that arise.  "

## 2024-11-05 NOTE — PROGRESS NOTES
Progress Note - OB/GYN   Name: Manju Khalil 38 y.o. female I MRN: [id number]  Unit/Bed#: -01 I Date of Admission: 11/3/2024   Date of Service: 2024 I Hospital Day: 2     Assessment & Plan  Preeclampsia, third trimester  D/C magnesium 24 hours after dx,   today  Continue nifedipine xl 30 mg OD, BP now mild range  Liver enzymes trending up, repeat CMP in AM  Insufficient prenatal care in third trimester      Post- Progress note    Patient is post-op day 1 from a  delivery.       Assessment and Plan:   38 y.o. year-old , postoperative day 1 status-post  delivery   Without apparent complications  Continue routine Post Op care, ambulate, increase diet,        Pain: controlled  Tolerating Oral Intake: yes  Voiding: yes  Flatus: yes  BM- no  Ambulating: yes  Breastfeeding:well  Shortness of Breath: no  Chest pain- no  Lochia: minimal    Objective:   Vitals:    24 1000   BP: 137/75   Pulse: 88   Resp:    Temp:    SpO2: 97%       Intake/Output Summary (Last 24 hours) at 2024 1047  Last data filed at 2024 0621  Gross per 24 hour   Intake 1727.08 ml   Output 3700 ml   Net -1972.92 ml       Recent Results (from the past 24 hour(s))   CBC and Platelet    Collection Time: 24 12:55 AM   Result Value Ref Range    WBC 12.46 (H) 4.31 - 10.16 Thousand/uL    RBC 3.02 (L) 3.81 - 5.12 Million/uL    Hemoglobin 7.1 (L) 11.5 - 15.4 g/dL    Hematocrit 23.0 (L) 34.8 - 46.1 %    MCV 76 (L) 82 - 98 fL    MCH 23.5 (L) 26.8 - 34.3 pg    MCHC 30.9 (L) 31.4 - 37.4 g/dL    RDW 14.7 11.6 - 15.1 %    Platelets 212 149 - 390 Thousands/uL    MPV 10.5 8.9 - 12.7 fL   Comprehensive metabolic panel    Collection Time: 24 12:55 AM   Result Value Ref Range    Sodium 132 (L) 135 - 147 mmol/L    Potassium 4.2 3.5 - 5.3 mmol/L    Chloride 104 96 - 108 mmol/L    CO2 22 21 - 32 mmol/L    ANION GAP 6 4 - 13 mmol/L    BUN 9 5 - 25 mg/dL    Creatinine 0.68 0.60 - 1.30 mg/dL    Glucose 81  65 - 140 mg/dL    Calcium 7.5 (L) 8.4 - 10.2 mg/dL    Corrected Calcium 8.4 8.3 - 10.1 mg/dL    AST 89 (H) 13 - 39 U/L    ALT 40 7 - 52 U/L    Alkaline Phosphatase 127 (H) 34 - 104 U/L    Total Protein 5.5 (L) 6.4 - 8.4 g/dL    Albumin 2.9 (L) 3.5 - 5.0 g/dL    Total Bilirubin 0.19 (L) 0.20 - 1.00 mg/dL    eGFR 111 ml/min/1.73sq m   Magnesium    Collection Time: 11/05/24 12:55 AM   Result Value Ref Range    Magnesium 4.7 (H) 1.9 - 2.7 mg/dL   CBC    Collection Time: 11/05/24  6:44 AM   Result Value Ref Range    WBC 11.77 (H) 4.31 - 10.16 Thousand/uL    RBC 3.34 (L) 3.81 - 5.12 Million/uL    Hemoglobin 7.9 (L) 11.5 - 15.4 g/dL    Hematocrit 25.5 (L) 34.8 - 46.1 %    MCV 76 (L) 82 - 98 fL    MCH 23.7 (L) 26.8 - 34.3 pg    MCHC 31.0 (L) 31.4 - 37.4 g/dL    RDW 14.8 11.6 - 15.1 %    Platelets 241 149 - 390 Thousands/uL    MPV 10.2 8.9 - 12.7 fL   Comprehensive metabolic panel    Collection Time: 11/05/24  6:44 AM   Result Value Ref Range    Sodium 135 135 - 147 mmol/L    Potassium 4.0 3.5 - 5.3 mmol/L    Chloride 105 96 - 108 mmol/L    CO2 22 21 - 32 mmol/L    ANION GAP 8 4 - 13 mmol/L    BUN 6 5 - 25 mg/dL    Creatinine 0.62 0.60 - 1.30 mg/dL    Glucose 85 65 - 140 mg/dL    Calcium 7.4 (L) 8.4 - 10.2 mg/dL    Corrected Calcium 7.9 (L) 8.3 - 10.1 mg/dL     (H) 13 - 39 U/L    ALT 48 7 - 52 U/L    Alkaline Phosphatase 145 (H) 34 - 104 U/L    Total Protein 6.4 6.4 - 8.4 g/dL    Albumin 3.4 (L) 3.5 - 5.0 g/dL    Total Bilirubin 0.21 0.20 - 1.00 mg/dL    eGFR 114 ml/min/1.73sq m       Physical Exam:  General: a/a/o x 3 no acute distress  Cardiovascular: RRR  Lungs: clear  Abdomen: soft, nontender, minimal distention, normal BS  Fundus: below umbilicus  Incision: clean, dry, intact  Lower extremeties: minimal edema no signs DVT        Sharon De Los Santos, DO

## 2024-11-05 NOTE — QUICK NOTE
Update:    BP persistently > 160/110.    No HA, no VF changes.    Preeclampsia discussed with her at length, all qs answered.    A/P. 37yo  POD#0 s/p CS, with preeclampsia with severe features.  (1) Preeclampsia with severe features.    --> Labetalol 20mg IV x 1 now.  --> Magnesium sulfate for seizure prophylaxis.  --> Repeat labs in 6 hours.    Kip Cardona MD    ----  Update:    On arriving at bedside to give IV labetalol, BP was 157/96.    --> Will hold labetalol for now, give Procardia XL 30mg po q24 to start now.  --> Will give magnesium sulfate.    Kip Cardona MD

## 2024-11-05 NOTE — ASSESSMENT & PLAN NOTE
D/C magnesium 24 hours after dx,  1945 today  Continue nifedipine xl 30 mg OD, BP now mild range  Liver enzymes trending up, repeat CMP in AM

## 2024-11-06 PROBLEM — O26.899 RH NEGATIVE, DELIVERED, CURRENT HOSPITALIZATION: Status: ACTIVE | Noted: 2024-11-06

## 2024-11-06 PROBLEM — Z67.91 RH NEGATIVE, DELIVERED, CURRENT HOSPITALIZATION: Status: ACTIVE | Noted: 2024-11-06

## 2024-11-06 PROBLEM — Z98.891 STATUS POST PRIMARY LOW TRANSVERSE CESAREAN SECTION: Status: ACTIVE | Noted: 2024-11-06

## 2024-11-06 LAB
ALBUMIN SERPL BCG-MCNC: 3.3 G/DL (ref 3.5–5)
ALP SERPL-CCNC: 125 U/L (ref 34–104)
ALT SERPL W P-5'-P-CCNC: 46 U/L (ref 7–52)
ANION GAP SERPL CALCULATED.3IONS-SCNC: 8 MMOL/L (ref 4–13)
AST SERPL W P-5'-P-CCNC: 72 U/L (ref 13–39)
BASOPHILS # BLD AUTO: 0.03 THOUSANDS/ÂΜL (ref 0–0.1)
BASOPHILS NFR BLD AUTO: 0 % (ref 0–1)
BILIRUB SERPL-MCNC: 0.2 MG/DL (ref 0.2–1)
BUN SERPL-MCNC: 6 MG/DL (ref 5–25)
CALCIUM ALBUM COR SERPL-MCNC: 8.3 MG/DL (ref 8.3–10.1)
CALCIUM SERPL-MCNC: 7.7 MG/DL (ref 8.4–10.2)
CHLORIDE SERPL-SCNC: 105 MMOL/L (ref 96–108)
CO2 SERPL-SCNC: 26 MMOL/L (ref 21–32)
CREAT SERPL-MCNC: 0.63 MG/DL (ref 0.6–1.3)
EOSINOPHIL # BLD AUTO: 0.16 THOUSAND/ÂΜL (ref 0–0.61)
EOSINOPHIL NFR BLD AUTO: 2 % (ref 0–6)
ERYTHROCYTE [DISTWIDTH] IN BLOOD BY AUTOMATED COUNT: 15 % (ref 11.6–15.1)
GFR SERPL CREATININE-BSD FRML MDRD: 114 ML/MIN/1.73SQ M
GLUCOSE SERPL-MCNC: 74 MG/DL (ref 65–140)
HCT VFR BLD AUTO: 26.6 % (ref 34.8–46.1)
HGB BLD-MCNC: 8.3 G/DL (ref 11.5–15.4)
IMM GRANULOCYTES # BLD AUTO: 0.13 THOUSAND/UL (ref 0–0.2)
IMM GRANULOCYTES NFR BLD AUTO: 1 % (ref 0–2)
LYMPHOCYTES # BLD AUTO: 2.46 THOUSANDS/ÂΜL (ref 0.6–4.47)
LYMPHOCYTES NFR BLD AUTO: 25 % (ref 14–44)
MCH RBC QN AUTO: 23.7 PG (ref 26.8–34.3)
MCHC RBC AUTO-ENTMCNC: 31.2 G/DL (ref 31.4–37.4)
MCV RBC AUTO: 76 FL (ref 82–98)
MONOCYTES # BLD AUTO: 0.52 THOUSAND/ÂΜL (ref 0.17–1.22)
MONOCYTES NFR BLD AUTO: 5 % (ref 4–12)
NEUTROPHILS # BLD AUTO: 6.66 THOUSANDS/ÂΜL (ref 1.85–7.62)
NEUTS SEG NFR BLD AUTO: 67 % (ref 43–75)
NRBC BLD AUTO-RTO: 0 /100 WBCS
PLATELET # BLD AUTO: 250 THOUSANDS/UL (ref 149–390)
PMV BLD AUTO: 9.5 FL (ref 8.9–12.7)
POTASSIUM SERPL-SCNC: 4 MMOL/L (ref 3.5–5.3)
PROT SERPL-MCNC: 6.6 G/DL (ref 6.4–8.4)
RBC # BLD AUTO: 3.5 MILLION/UL (ref 3.81–5.12)
SODIUM SERPL-SCNC: 139 MMOL/L (ref 135–147)
WBC # BLD AUTO: 9.96 THOUSAND/UL (ref 4.31–10.16)

## 2024-11-06 PROCEDURE — 85025 COMPLETE CBC W/AUTO DIFF WBC: CPT | Performed by: OBSTETRICS & GYNECOLOGY

## 2024-11-06 PROCEDURE — 90656 IIV3 VACC NO PRSV 0.5 ML IM: CPT | Performed by: OBSTETRICS & GYNECOLOGY

## 2024-11-06 PROCEDURE — 90686 IIV4 VACC NO PRSV 0.5 ML IM: CPT | Performed by: OBSTETRICS & GYNECOLOGY

## 2024-11-06 PROCEDURE — 99024 POSTOP FOLLOW-UP VISIT: CPT | Performed by: OBSTETRICS & GYNECOLOGY

## 2024-11-06 PROCEDURE — 80053 COMPREHEN METABOLIC PANEL: CPT | Performed by: OBSTETRICS & GYNECOLOGY

## 2024-11-06 PROCEDURE — 90471 IMMUNIZATION ADMIN: CPT | Performed by: OBSTETRICS & GYNECOLOGY

## 2024-11-06 RX ORDER — NIFEDIPINE 30 MG/1
30 TABLET, EXTENDED RELEASE ORAL EVERY 24 HOURS
Status: DISCONTINUED | OUTPATIENT
Start: 2024-11-06 | End: 2024-11-07 | Stop reason: HOSPADM

## 2024-11-06 RX ADMIN — IBUPROFEN 600 MG: 600 TABLET, FILM COATED ORAL at 09:02

## 2024-11-06 RX ADMIN — PANTOPRAZOLE SODIUM 20 MG: 20 TABLET, DELAYED RELEASE ORAL at 05:51

## 2024-11-06 RX ADMIN — IBUPROFEN 600 MG: 600 TABLET, FILM COATED ORAL at 23:04

## 2024-11-06 RX ADMIN — IBUPROFEN 600 MG: 600 TABLET, FILM COATED ORAL at 15:35

## 2024-11-06 RX ADMIN — IRON SUCROSE 200 MG: 20 INJECTION, SOLUTION INTRAVENOUS at 11:14

## 2024-11-06 RX ADMIN — IBUPROFEN 600 MG: 600 TABLET, FILM COATED ORAL at 03:09

## 2024-11-06 RX ADMIN — ACETAMINOPHEN 650 MG: 325 TABLET, FILM COATED ORAL at 18:21

## 2024-11-06 RX ADMIN — DOCUSATE SODIUM 100 MG: 100 CAPSULE, LIQUID FILLED ORAL at 18:21

## 2024-11-06 RX ADMIN — ACETAMINOPHEN 650 MG: 325 TABLET, FILM COATED ORAL at 05:51

## 2024-11-06 RX ADMIN — LAMOTRIGINE 100 MG: 25 TABLET ORAL at 09:02

## 2024-11-06 RX ADMIN — INFLUENZA VIRUS VACCINE 0.5 ML: 15; 15; 15 SUSPENSION INTRAMUSCULAR at 09:58

## 2024-11-06 RX ADMIN — NIFEDIPINE 60 MG: 30 TABLET, FILM COATED, EXTENDED RELEASE ORAL at 19:49

## 2024-11-06 RX ADMIN — ACETAMINOPHEN 650 MG: 325 TABLET, FILM COATED ORAL at 23:05

## 2024-11-06 RX ADMIN — SERTRALINE 100 MG: 100 TABLET, FILM COATED ORAL at 09:02

## 2024-11-06 RX ADMIN — ACETAMINOPHEN 650 MG: 325 TABLET, FILM COATED ORAL at 12:11

## 2024-11-06 RX ADMIN — FUROSEMIDE 20 MG: 20 TABLET ORAL at 09:04

## 2024-11-06 RX ADMIN — PRENATAL VIT W/ FE FUMARATE-FA TAB 27-0.8 MG 1 TABLET: 27-0.8 TAB at 09:02

## 2024-11-06 RX ADMIN — NIFEDIPINE 30 MG: 30 TABLET, FILM COATED, EXTENDED RELEASE ORAL at 09:56

## 2024-11-06 RX ADMIN — DOCUSATE SODIUM 100 MG: 100 CAPSULE, LIQUID FILLED ORAL at 09:02

## 2024-11-06 NOTE — PLAN OF CARE

## 2024-11-06 NOTE — ASSESSMENT & PLAN NOTE
A/P.  38 y.o.  POD#2 s/p , Low Transverse at 39w5d of 3850 g (8 lb 7.8 oz) male , apgars 8 /9 .  (1) POD#2.  Delivered 2024  8:07 AM.  Doing well.  --> Routine postoperative care.

## 2024-11-06 NOTE — PROGRESS NOTES
"Progress Note - OB/GYN   Name: Manju Khalil 38 y.o. female I MRN: [id number]  Unit/Bed#: -01 I Date of Admission: 11/3/2024   Date of Service: 2024 I Hospital Day: 3     Assessment & Plan  Status post primary low transverse  section  A/P.  38 y.o.  POD#2 s/p , Low Transverse at 39w5d of 3850 g (8 lb 7.8 oz) male , apgars 8 /9 .  (1) POD#2.  Delivered 2024  8:07 AM.  Doing well.  --> Routine postoperative care.  Severe pre-eclampsia, with delivery  Status-post magnesium.  AST had been as high at 108, now downtrending to 72.  Creatinine, platelets okay.  Current regimen:    Procardia XL 30mg qAM (added this morning)    Procardia XL 60mg qPM    Furosemide 20mg qDay x 5 days.  --> Continue current meds, adjusted up nifedipine as above.  --> Repeat labs in AM.  Generalized anxiety disorder  --> Continue sertraline 100mg qDay    Rh negative, delivered, current hospitalization  Infant also Rh negative  --> RhoGAM not indicated.  Anemia.  Hgb 8.3g/dL.  --> IV iron.    Kip Cardona MD  24  ----------------------------------------------------------------------------------------------    Subjective/    Feels well.  Tolerating po, ambulating, voiding without difficulty.  Happy.  Bonding.  Pain controlled with oral medications.  (+)flatus.    Objective/  Blood pressure 143/91, pulse 91, temperature 97.8 °F (36.6 °C), temperature source Temporal, resp. rate 18, height 5' 4\" (1.626 m), weight 90.3 kg (199 lb), last menstrual period 2024, SpO2 95%, currently breastfeeding.    Patient Vitals for the past 24 hrs:   BP Temp Temp src Pulse Resp SpO2   24 0904 143/91 -- -- 91 -- --   24 0700 137/85 97.8 °F (36.6 °C) Temporal 86 18 95 %   24 2345 141/93 -- -- 80 -- --   24 2313 154/85 98.2 °F (36.8 °C) Temporal 80 18 --   24 130/74 98.2 °F (36.8 °C) Temporal 84 18 --   24 1443 149/78 -- -- 77 21 97 %   24 1147 142/89 " 98.5 °F (36.9 °C) Temporal 89 18 98 %   11/05/24 1000 137/75 -- -- 88 -- 97 %         Physical Exam/      General:  Alert, comfortable, NAD      Cardiovascular: Regular rate and rhythm      Respiratory: Clear to auscultation bilaterally.      Abdomen: Soft, non-tender, non-distended.  Dressing clean and dry.      Fundus: Firm, below umbilicus, nontender      Extremities: Warm, non-tender      Labs/      Blood type:  A-/-      Rubella: Immune      Lab Results   Component Value Date    HGB 8.3 (L) 11/06/2024     11/06/2024    BUN 6 11/06/2024    CREATININE 0.63 11/06/2024    AST 72 (H) 11/06/2024    ALT 46 11/06/2024    UTPCR 0.5 (H) 11/03/2024

## 2024-11-06 NOTE — LACTATION NOTE
This note was copied from a baby's chart.  Met with mother to follow up and discuss the Breastfeeding Discharge Booklet with plans for an early discharge today.    Baby is currently at 6.6% weight loss, having appropriate output for his age and 24 hour bilirubin was low.    Mother discussed that she has been doing well and has no concerns.    Discussed the importance of ensuring that baby feeds 8-12x in 24 hours and that baby has 6 wet diapers or more that are becoming more dilute as well as soiled diapers that are transitioning demonstrated by color change from meconium to a yellow/gold seedy loose stool by day 5.    Mother was given resources to look up medications to ensure they are safe with breastfeeding, by communicating with the [facility], [facility], [facility] as well as using [email or web address] (assisted mother to pin to home screen on personal phone).    Mother is aware of engorgement time frame (when mature milk comes in) and management as well as how to deal with conditions that may occur while breastfeeding (plugged ducts, milk blebs and mastitis) and when is appropriate to communicate with her OB/GYN and/or a lactation consultant.    Mother is aware of how to set up a pump, how to cycle (stimulation vs expression phases during a pumping session), importance of flange fit and trying different sizes to ensure best fit, milk storage and how to properly clean parts. Discussed handouts for tips on pumping when returning to work and paced bottle feeding.    Mother was provided with a manual hand pump during encounter.    Discussed community resources for continued support in breastfeeding once discharged home.     She was encouraged to call for further questions that arise prior to discharge.

## 2024-11-06 NOTE — ASSESSMENT & PLAN NOTE
Status-post magnesium.  AST had been as high at 108, now downtrending to 72.  Creatinine, platelets okay.  Current regimen:    Procardia XL 30mg qAM (added this morning)    Procardia XL 60mg qPM    Furosemide 20mg qDay x 5 days.  --> Continue current meds, adjusted up nifedipine as above.  --> Repeat labs in AM.

## 2024-11-07 VITALS
HEART RATE: 78 BPM | BODY MASS INDEX: 33.97 KG/M2 | TEMPERATURE: 98.1 F | OXYGEN SATURATION: 98 % | DIASTOLIC BLOOD PRESSURE: 76 MMHG | SYSTOLIC BLOOD PRESSURE: 118 MMHG | HEIGHT: 64 IN | WEIGHT: 199 LBS | RESPIRATION RATE: 18 BRPM

## 2024-11-07 LAB
ALBUMIN SERPL BCG-MCNC: 3.3 G/DL (ref 3.5–5)
ALP SERPL-CCNC: 125 U/L (ref 34–104)
ALT SERPL W P-5'-P-CCNC: 46 U/L (ref 7–52)
ANION GAP SERPL CALCULATED.3IONS-SCNC: 8 MMOL/L (ref 4–13)
AST SERPL W P-5'-P-CCNC: 57 U/L (ref 13–39)
BILIRUB SERPL-MCNC: 0.29 MG/DL (ref 0.2–1)
BUN SERPL-MCNC: 7 MG/DL (ref 5–25)
CALCIUM ALBUM COR SERPL-MCNC: 9.2 MG/DL (ref 8.3–10.1)
CALCIUM SERPL-MCNC: 8.6 MG/DL (ref 8.4–10.2)
CHLORIDE SERPL-SCNC: 104 MMOL/L (ref 96–108)
CO2 SERPL-SCNC: 25 MMOL/L (ref 21–32)
CREAT SERPL-MCNC: 0.67 MG/DL (ref 0.6–1.3)
GFR SERPL CREATININE-BSD FRML MDRD: 111 ML/MIN/1.73SQ M
GLUCOSE SERPL-MCNC: 74 MG/DL (ref 65–140)
POTASSIUM SERPL-SCNC: 4.2 MMOL/L (ref 3.5–5.3)
PROT SERPL-MCNC: 6.5 G/DL (ref 6.4–8.4)
SODIUM SERPL-SCNC: 137 MMOL/L (ref 135–147)

## 2024-11-07 PROCEDURE — 88307 TISSUE EXAM BY PATHOLOGIST: CPT | Performed by: PATHOLOGY

## 2024-11-07 PROCEDURE — 80053 COMPREHEN METABOLIC PANEL: CPT | Performed by: OBSTETRICS & GYNECOLOGY

## 2024-11-07 RX ORDER — LAMOTRIGINE 100 MG/1
100 TABLET ORAL DAILY
Qty: 90 TABLET | Refills: 0 | Status: SHIPPED | OUTPATIENT
Start: 2024-11-07 | End: 2025-02-05

## 2024-11-07 RX ORDER — NIFEDIPINE 30 MG/1
30 TABLET, EXTENDED RELEASE ORAL DAILY
Qty: 90 TABLET | Refills: 1 | Status: SHIPPED | OUTPATIENT
Start: 2024-11-07

## 2024-11-07 RX ORDER — OXYCODONE HYDROCHLORIDE 5 MG/1
5 TABLET ORAL EVERY 4 HOURS PRN
Qty: 5 TABLET | Refills: 0 | Status: SHIPPED | OUTPATIENT
Start: 2024-11-07 | End: 2024-11-17

## 2024-11-07 RX ORDER — SERTRALINE HYDROCHLORIDE 100 MG/1
100 TABLET, FILM COATED ORAL
Qty: 90 TABLET | Refills: 1 | Status: SHIPPED | OUTPATIENT
Start: 2024-11-07 | End: 2024-11-07

## 2024-11-07 RX ORDER — SERTRALINE HYDROCHLORIDE 100 MG/1
100 TABLET, FILM COATED ORAL DAILY
Qty: 90 TABLET | Refills: 0 | Status: SHIPPED | OUTPATIENT
Start: 2024-11-07 | End: 2025-02-05

## 2024-11-07 RX ORDER — IBUPROFEN 600 MG/1
600 TABLET, FILM COATED ORAL EVERY 6 HOURS
Qty: 90 TABLET | Refills: 0 | Status: SHIPPED | OUTPATIENT
Start: 2024-11-07

## 2024-11-07 RX ORDER — LAMOTRIGINE 100 MG/1
100 TABLET ORAL DAILY
Qty: 90 TABLET | Refills: 1 | Status: SHIPPED | OUTPATIENT
Start: 2024-11-07

## 2024-11-07 RX ORDER — SERTRALINE HYDROCHLORIDE 100 MG/1
100 TABLET, FILM COATED ORAL
Qty: 90 TABLET | Refills: 1 | Status: SHIPPED | OUTPATIENT
Start: 2024-11-07

## 2024-11-07 RX ORDER — FUROSEMIDE 20 MG/1
20 TABLET ORAL DAILY
Qty: 3 TABLET | Refills: 0 | Status: SHIPPED | OUTPATIENT
Start: 2024-11-07 | End: 2024-11-10

## 2024-11-07 RX ADMIN — SERTRALINE 100 MG: 100 TABLET, FILM COATED ORAL at 09:00

## 2024-11-07 RX ADMIN — LAMOTRIGINE 100 MG: 25 TABLET ORAL at 09:01

## 2024-11-07 RX ADMIN — IBUPROFEN 600 MG: 600 TABLET, FILM COATED ORAL at 13:49

## 2024-11-07 RX ADMIN — PRENATAL VIT W/ FE FUMARATE-FA TAB 27-0.8 MG 1 TABLET: 27-0.8 TAB at 09:00

## 2024-11-07 RX ADMIN — FUROSEMIDE 20 MG: 20 TABLET ORAL at 13:52

## 2024-11-07 RX ADMIN — ACETAMINOPHEN 650 MG: 325 TABLET, FILM COATED ORAL at 06:06

## 2024-11-07 RX ADMIN — NIFEDIPINE 30 MG: 30 TABLET, FILM COATED, EXTENDED RELEASE ORAL at 09:00

## 2024-11-07 RX ADMIN — IBUPROFEN 600 MG: 600 TABLET, FILM COATED ORAL at 06:07

## 2024-11-07 RX ADMIN — DOCUSATE SODIUM 100 MG: 100 CAPSULE, LIQUID FILLED ORAL at 09:00

## 2024-11-07 RX ADMIN — IRON SUCROSE 200 MG: 20 INJECTION, SOLUTION INTRAVENOUS at 09:54

## 2024-11-07 NOTE — PLAN OF CARE
Problem: PAIN - ADULT  Goal: Verbalizes/displays adequate comfort level or baseline comfort level  Description: Interventions:  - Encourage patient to monitor pain and request assistance  - Assess pain using appropriate pain scale  - Administer analgesics based on type and severity of pain and evaluate response  - Implement non-pharmacological measures as appropriate and evaluate response  - Consider cultural and social influences on pain and pain management  - Notify physician/advanced practitioner if interventions unsuccessful or patient reports new pain  Outcome: Progressing     Problem: POSTPARTUM  Goal: Experiences normal postpartum course  Description: INTERVENTIONS:  - Monitor maternal vital signs  - Assess uterine involution and lochia  Outcome: Progressing  Goal: Appropriate maternal -  bonding  Description: INTERVENTIONS:  - Identify family support  - Assess for appropriate maternal/infant bonding   -Encourage maternal/infant bonding opportunities  - Referral to  or  as needed  Outcome: Progressing  Goal: Establishment of infant feeding pattern  Description: INTERVENTIONS:  - Assess breast/bottle feeding  - Refer to lactation as needed  Outcome: Progressing  Goal: Incision(s), wounds(s) or drain site(s) healing without S/S of infection  Description: INTERVENTIONS  - Assess and document dressing, incision, wound bed, drain sites and surrounding tissue  - Provide patient and family education  - Perform skin care/dressing changes every   Outcome: Progressing     Problem: INFECTION - ADULT  Goal: Absence or prevention of progression during hospitalization  Description: INTERVENTIONS:  - Assess and monitor for signs and symptoms of infection  - Monitor lab/diagnostic results  - Monitor all insertion sites, i.e. indwelling lines, tubes, and drains  - Monitor endotracheal if appropriate and nasal secretions for changes in amount and color  - [place] appropriate cooling/warming  Pt reports he is feeling weak and tired today did ambulate with cardiac rehab twice and sat in chair. O2 remains at 1 liter per n/c dyspneic on exertion potassium 3.3 started protocol next shift aware. Pt denies pain medication this shift and was anxious this morning and obtained relief from prn ativan. Incisions intact bruised noted petichia like rash to both sides of sternal incision pt prefers no lidocaine patches and declined scheduled heparin today.needs encouragement reminding of IS use voiding per urinal.has poor appetite and refused to order meals did take boost supplement.   therapies per order  - Administer medications as ordered  - Instruct and encourage patient and family to use good hand hygiene technique  - Identify and instruct in appropriate isolation precautions for identified infection/condition  Outcome: Progressing  Goal: Absence of fever/infection during neutropenic period  Description: INTERVENTIONS:  - Monitor WBC    Outcome: Progressing     Problem: SAFETY ADULT  Goal: Patient will remain free of falls  Description: INTERVENTIONS:  - Educate patient/family on patient safety including physical limitations  - Instruct patient to call for assistance with activity   - Consult OT/PT to assist with strengthening/mobility   - Keep Call bell within reach  - Keep bed low and locked with side rails adjusted as appropriate  - Keep care items and personal belongings within reach  - Initiate and maintain comfort rounds  - Make Fall Risk Sign visible to staff  - Offer Toileting every  Hours, in advance of need  - Initiate/Maintain alarm  - Obtain necessary fall risk management equipment  - Apply yellow socks and bracelet for high fall risk patients  - Consider moving patient to room near nurses station  Outcome: Progressing  Goal: Maintain or return to baseline ADL function  Description: INTERVENTIONS:  -  Assess patient's ability to carry out ADLs; assess patient's baseline for ADL function and identify physical deficits which impact ability to perform ADLs (bathing, care of mouth/teeth, toileting, grooming, dressing, etc.)  - Assess/evaluate cause of self-care deficits   - Assess range of motion  - Assess patient's mobility; develop plan if impaired  - Assess patient's need for assistive devices and provide as appropriate  - Encourage maximum independence but intervene and supervise when necessary  - Involve family in performance of ADLs  - Assess for home care needs following discharge   - Consider OT consult to assist with ADL evaluation and planning for discharge  - Provide  patient education as appropriate  Outcome: Progressing  Goal: Maintains/Returns to pre admission functional level  Description: INTERVENTIONS:  - Perform AM-PAC 6 Click Basic Mobility/ Daily Activity assessment daily.  - Set and communicate daily mobility goal to care team and patient/family/caregiver.   - Collaborate with rehabilitation services on mobility goals if consulted  - Perform Range of Motion  times a day.  - Reposition patient every  hours.  - Dangle patient  times a day  - Stand patient  times a day  - Ambulate patient  times a day  - Out of bed to chair  times a day   - Out of bed for meals  times a day  - Out of bed for toileting  - Record patient progress and toleration of activity level   Outcome: Progressing     Problem: Knowledge Deficit  Goal: Patient/family/caregiver demonstrates understanding of disease process, treatment plan, medications, and discharge instructions  Description: Complete learning assessment and assess knowledge base.  Interventions:  - Provide teaching at level of understanding  - Provide teaching via preferred learning methods  Outcome: Progressing     Problem: DISCHARGE PLANNING  Goal: Discharge to home or other facility with appropriate resources  Description: INTERVENTIONS:  - Identify barriers to discharge w/patient and caregiver  - Arrange for needed discharge resources and transportation as appropriate  - Identify discharge learning needs (meds, wound care, etc.)  - Arrange for interpretive services to assist at discharge as needed  - Refer to Case Management Department for coordinating discharge planning if the patient needs post-hospital services based on physician/advanced practitioner order or complex needs related to functional status, cognitive ability, or social support system  Outcome: Progressing

## 2024-11-07 NOTE — DISCHARGE SUMMARY
ADMISSION  Patient of: [facility]  Admission Date: 11/3/2024   Admitting Attending: Dr. JAMES Woods MD   Admitting Diagnoses:   Patient Active Problem List   Diagnosis    GERD (gastroesophageal reflux disease)    Migraines    Irritable bowel syndrome with diarrhea    Generalized anxiety disorder    Carpal tunnel syndrome of right wrist    PTSD (post-traumatic stress disorder)    OCD (obsessive compulsive disorder)    12 weeks gestation of pregnancy    Multigravida of advanced maternal age in first trimester    Maternal care due to low transverse uterine scar from previous  delivery    Obesity complicating pregnancy, childbirth, or puerperium, antepartum    Insufficient prenatal care in third trimester    Severe pre-eclampsia, with delivery    Status post primary low transverse  section    Rh negative, delivered, current hospitalization       DELIVERY  Delivery Method: , Low Transverse   Delivery Date and Time: 2024 8:07 AM  Delivery Attending: JAMES Woods     DISCHARGE  Discharge Date: 24  Discharge Attending: Dr. Mimi Hallman MD  Discharge Diagnosis:   Same, Delivered     Clinical course: Admission to Delivery  Manju Khalil is a 38 y.o.  who was admitted at 39w5d for contractions.       For pain control in labor, pt received epidural.       Delivery  Route of Delivery: , Low Transverse   Reason for  delivery: Macrosomia limited prenatal care with diagnosis of undiagnosed diabetes found on presentation, suspected macrosomia with EFW ~4200-4500g.    Anesthesia: Epidural,   QBL:        QBL:        Delivery: , Low Transverse at 2024 8:07 AM       Baby's Weight: 3850 g (8 lb 7.8 oz); 135.8    Apgar scores: 8  and 9  at 1 and 5 minutes, respectively    Clinical Course: Post-Delivery:  The post delivery course was remarkable for preeclampsia requiring initiation of antihypertensive therapy    On the day of  discharge, the patient was ambulating, voiding spontaneously, tolerating oral intake, and hemodynamically stable. She was able to reasonably perform all ADLs. She had appropriate bowel function. Pain was well-controlled. She was discharged home on postpartum/postop day #3 without complications. Patient was instructed to follow up with her OB as an outpatient and was given appropriate warnings to call her provider with problems or concerns.    She had been started on antihypertensives for control of blood pressure. She did not require magnesium prophylaxis as she had no severe signs/symptoms of preeclampsia. She will continue Procardia Xl 30 mg daily in morning, and 60 mg in the evening. She will need a blood pressure check within the week.     Pertinent lab findings included:   Blood type A-.     Last three Hgb values:  Lab Results   Component Value Date    HGB 8.3 (L) 2024    HGB 7.9 (L) 2024    HGB 7.1 (L) 2024        Problem-specific follow-up plans included the following:  Assessment & Plan  Status post primary low transverse  section  A/P.  38 y.o.  POD#3 s/p , Low Transverse at 39w5d of 3850 g (8 lb 7.8 oz) male , apgars 8 /9 .  (1) POD#3.  Delivered 2024  8:07 AM.  Doing well.  --> Routine postoperative care.  - ok for discharge home today    Severe pre-eclampsia, with delivery  Status-post magnesium.  Labs trending towards normal  Current regimen is controlling BP well    Procardia XL 30mg qAM     Procardia XL 60mg qPM    Furosemide 20mg qDay x 5 days  Generalized anxiety disorder  --> Continue sertraline 100mg qDay  --> continue lamictal  Rh negative, delivered, current hospitalization  Infant also Rh negative  --> RhoGAM not indicated.  Insufficient prenatal care in third trimester      Discharge med list:  Contraception:       Medication List      START taking these medications     furosemide 20 mg tablet; Commonly known as: LASIX; Take 1 tablet (20 mg    total) by mouth daily for 3 doses   ibuprofen 600 mg tablet; Commonly known as: MOTRIN; Take 1 tablet (600   mg total) by mouth every 6 (six) hours   NIFEdipine 30 mg 24 hr tablet; Commonly known as: PROCARDIA XL; Take 1   tablet (30 mg total) by mouth daily Take 1 tablet daily in the morning (30   mg), and take 2 tablets daily in the evening/bedtime (60 mg)   oxyCODONE 5 immediate release tablet; Commonly known as: Roxicodone;   Take 1 tablet (5 mg total) by mouth every 4 (four) hours as needed for   moderate pain for up to 10 days Max Daily Amount: 30 mg     CHANGE how you take these medications     * lamoTRIgine 100 mg tablet; Commonly known as: LaMICtal; Take 1 tablet   (100 mg total) by mouth daily; What changed: Another medication with the   same name was added. Make sure you understand how and when to take each.   * lamoTRIgine 100 mg tablet; Commonly known as: LaMICtal; Take 1 tablet   (100 mg total) by mouth daily; What changed: You were already taking a   medication with the same name, and this prescription was added. Make sure   you understand how and when to take each.   * sertraline 100 mg tablet; Commonly known as: ZOLOFT; Take 1 tablet   (100 mg total) by mouth daily at bedtime; What changed: Another medication   with the same name was added. Make sure you understand how and when to   take each.   * sertraline 100 mg tablet; Commonly known as: ZOLOFT; Take 1 tablet   (100 mg total) by mouth daily; What changed: You were already taking a   medication with the same name, and this prescription was added. Make sure   you understand how and when to take each.  * This list has 4 medication(s) that are the same as other medications   prescribed for you. Read the directions carefully, and ask your doctor or   other care provider to review them with you.     CONTINUE taking these medications     melatonin 3 mg   omeprazole 20 mg delayed release capsule; Commonly known as: PriLOSEC   PRENATAL MULTIVITAMIN  PLUS DHA PO     STOP taking these medications     aspirin 81 mg chewable tablet   diphenhydrAMINE HCl (Sleep) 50 MG Caps   Fish Oil 500 MG Caps       Condition at discharge:   good     Disposition:   Home    Planned Readmission:   No    Discharge Statement:  I have spent a total time of  30 minutes in caring for this patient on the day of the visit/encounter. >30 minutes of time was spent on: Counseling / Coordination of care, Documenting in the medical record, and Reviewing / ordering tests, medicine, procedures  .

## 2024-11-07 NOTE — ASSESSMENT & PLAN NOTE
Status-post magnesium.  Labs trending towards normal  Current regimen is controlling BP well    Procardia XL 30mg qAM     Procardia XL 60mg qPM    Furosemide 20mg qDay x 5 days

## 2024-11-07 NOTE — PROGRESS NOTES
Progress Note - OB/GYN  Post-Partum Physician Note   Manju Khalil 38 y.o. female MRN: [id number]  Unit/Bed#:  201-01 Encounter: [id number]  Assessment:  38 y.o. year-old , post-op/postpartum day #3 status-post  delivery    Plan:  Assessment & Plan  Status post primary low transverse  section  A/P.  38 y.o.  POD#3 s/p , Low Transverse at 39w5d of 3850 g (8 lb 7.8 oz) male , apgars 8 /9 .  (1) POD#3.  Delivered 2024  8:07 AM.  Doing well.  --> Routine postoperative care.  - ok for discharge home today    Severe pre-eclampsia, with delivery  Status-post magnesium.  Labs trending towards normal  Current regimen is controlling BP well    Procardia XL 30mg qAM     Procardia XL 60mg qPM    Furosemide 20mg qDay x 5 days  Generalized anxiety disorder  --> Continue sertraline 100mg qDay  --> continue lamictal  Rh negative, delivered, current hospitalization  Infant also Rh negative  --> RhoGAM not indicated.  Insufficient prenatal care in third trimester      ______________________________________________  Patient is postop and postpartum day #3 following  delivery     Subjective:   No acute events overnight. Denies HA, visual changes, epigastric pain. Denies shortness of breath or chest pain. Ambulating and voiding without difficulty.  Good urine output. minimal lochia. breastfeeding      Objective:   Vitals:    24 2250 24 0005 24 0236 24 0700   BP: 151/92 150/88 137/62 118/76   BP Location: Right arm  Right arm Right arm   Pulse: 104 93 83 78   Resp: 18  18 18   Temp: 98.3 °F (36.8 °C)  97.5 °F (36.4 °C) 98.1 °F (36.7 °C)   TempSrc: Temporal  Temporal Oral   SpO2: 99%  99% 98%   Weight:       Height:         No intake or output data in the 24 hours ending 24 0826    Physical Exam:  General: AOx3, NAD  Lungs: CTAB  CV: RRR  Abdomen: soft, fundus firm below umbilicus, appropriately tender; incision c/d/I under mepilex  Extremities:  "nontender; trace pedal edema b/l    Labs/Tests:   Lab Results   Component Value Date/Time    HGB 8.3 (L) 11/06/2024 06:32 AM    HGB 7.9 (L) 11/05/2024 06:44 AM    HGB 7.1 (L) 11/05/2024 12:55 AM     11/06/2024 06:32 AM     11/05/2024 06:44 AM     11/05/2024 12:55 AM    WBC 9.96 11/06/2024 06:32 AM    WBC 11.77 (H) 11/05/2024 06:44 AM    WBC 12.46 (H) 11/05/2024 12:55 AM    CREATININE 0.63 11/06/2024 06:32 AM    CREATININE 0.62 11/05/2024 06:44 AM    CREATININE 0.68 11/05/2024 12:55 AM    ALT 46 11/06/2024 06:32 AM    ALT 48 11/05/2024 06:44 AM    ALT 40 11/05/2024 12:55 AM    AST 72 (H) 11/06/2024 06:32 AM     (H) 11/05/2024 06:44 AM    AST 89 (H) 11/05/2024 12:55 AM        Brief OB Lab review:  ABO Grouping   Date Value Ref Range Status   11/04/2024 A  Final      Rh Factor   Date Value Ref Range Status   11/04/2024 Negative  Final    No results found for: \"ANTIBODYSCR\"  No results found for: \"RUBM\"    MEDS:     Current Facility-Administered Medications:     diphenhydrAMINE (BENADRYL) injection 25 mg, Q6H PRN    docusate sodium (COLACE) capsule 100 mg, BID    furosemide (LASIX) tablet 20 mg, Daily    [COMPLETED] ketorolac (TORADOL) injection 30 mg, Q6H **FOLLOWED BY** ibuprofen (MOTRIN) tablet 600 mg, Q6H    iron sucrose (VENOFER) 200 mg in sodium chloride 0.9% 100 ml IVPB 200 mg, Daily, Last Rate: 200 mg (11/06/24 1114)    lactated ringers infusion, Continuous, Last Rate: Stopped (11/05/24 2015)    lamoTRIgine (LaMICtal) tablet 100 mg, Daily    melatonin tablet 3 mg, HS    NIFEdipine (PROCARDIA XL) 24 hr tablet 30 mg, Q24H    NIFEdipine (PROCARDIA XL) 24 hr tablet 60 mg, Q24H    ondansetron (ZOFRAN) injection 4 mg, Q8H PRN    oxyCODONE (ROXICODONE) IR tablet 5 mg, Q4H PRN    pantoprazole (PROTONIX) EC tablet 20 mg, Early Morning    prenatal multivitamin tablet 1 tablet, Daily    sertraline (ZOLOFT) tablet 100 mg, Daily    simethicone (MYLICON) chewable tablet 80 mg, 4x Daily " PRN  Invasive Devices       Peripheral Intravenous Line  Duration             Peripheral IV 11/03/24 Left;Ventral (anterior) Wrist 3 days                      Mimi Hough MD  11/7/2024 8:26 AM

## 2024-11-07 NOTE — LACTATION NOTE
This note was copied from a baby's chart.    In to see experienced Mom. States her milk and is coming in, baby is nursing well; is prepared for discharge home today and is aware of outpatient support available.       11/07/24 1015   Maternal Information   Has mother  before? Yes   How long did the the mother previously breastfeed? over a year with each   Previous Maternal Breastfeeding Challenges None   Infant to breast within first hour of birth? Yes   Exclusive Pump and Bottle Feed No   LATCH Documentation   Having latch problems? No  (as per experienced mom; documented latch score = 10)   Breasts/Nipples   Intervention Other (comment)  (Review good latch/feed & support available)   Breastfeeding Progress Not yet established   Other OB Lactation Tools   Feeding Devices Pump;Bottle   Breast Pump   Pump 2;1;3   Pump Review/Education Milk storage   Patient Follow-Up   Lactation Consult Status 2   Follow-Up Type Inpatient;Call as needed   Other OB Lactation Documentation    Additional Problem Noted Experienced Mom feels prepared for discharge and is aware of support available  (has BOTH Booklets @ bedside)     Encoraged nursing parent to call for assistance, questions and concerns.  Extension number for inpatient lactation support provided.

## 2024-11-07 NOTE — PLAN OF CARE
Problem: PAIN - ADULT  Goal: Verbalizes/displays adequate comfort level or baseline comfort level  Description: Interventions:  - Encourage patient to monitor pain and request assistance  - Assess pain using appropriate pain scale  - Administer analgesics based on type and severity of pain and evaluate response  - Implement non-pharmacological measures as appropriate and evaluate response  - Consider cultural and social influences on pain and pain management  - Notify physician/advanced practitioner if interventions unsuccessful or patient reports new pain  Outcome: Adequate for Discharge     Problem: POSTPARTUM  Goal: Experiences normal postpartum course  Description: INTERVENTIONS:  - Monitor maternal vital signs  - Assess uterine involution and lochia  Outcome: Adequate for Discharge  Goal: Appropriate maternal -  bonding  Description: INTERVENTIONS:  - Identify family support  - Assess for appropriate maternal/infant bonding   -Encourage maternal/infant bonding opportunities  - Referral to  or  as needed  Outcome: Adequate for Discharge  Goal: Establishment of infant feeding pattern  Description: INTERVENTIONS:  - Assess breast/bottle feeding  - Refer to lactation as needed  Outcome: Adequate for Discharge  Goal: Incision(s), wounds(s) or drain site(s) healing without S/S of infection  Description: INTERVENTIONS  - Assess and document dressing, incision, wound bed, drain sites and surrounding tissue  - Provide patient and family education    Outcome: Adequate for Discharge     Problem: INFECTION - ADULT  Goal: Absence or prevention of progression during hospitalization  Description: INTERVENTIONS:  - Assess and monitor for signs and symptoms of infection  - Monitor lab/diagnostic results  - Monitor all insertion sites, i.e. indwelling lines, tubes, and drains  - Monitor endotracheal if appropriate and nasal secretions for changes in amount and color  - Coram appropriate  cooling/warming therapies per order  - Administer medications as ordered  - Instruct and encourage patient and family to use good hand hygiene technique  - Identify and instruct in appropriate isolation precautions for identified infection/condition  Outcome: Adequate for Discharge  Goal: Absence of fever/infection during neutropenic period  Description: INTERVENTIONS:  - Monitor WBC    Outcome: Adequate for Discharge     Problem: SAFETY ADULT  Goal: Patient will remain free of falls  Description: INTERVENTIONS:  - Educate patient/family on patient safety including physical limitations  - Instruct patient to call for assistance with activity   - Consult OT/PT to assist with strengthening/mobility   - Keep Call bell within reach  - Keep bed low and locked with side rails adjusted as appropriate  - Keep care items and personal belongings within reach  - Initiate and maintain comfort rounds  - Make Fall Risk Sign visible to staff    - Apply yellow socks and bracelet for high fall risk patients  - Consider moving patient to room near nurses station  Outcome: Adequate for Discharge  Goal: Maintain or return to baseline ADL function  Description: INTERVENTIONS:  -  Assess patient's ability to carry out ADLs; assess patient's baseline for ADL function and identify physical deficits which impact ability to perform ADLs (bathing, care of mouth/teeth, toileting, grooming, dressing, etc.)  - Assess/evaluate cause of self-care deficits   - Assess range of motion  - Assess patient's mobility; develop plan if impaired  - Assess patient's need for assistive devices and provide as appropriate  - Encourage maximum independence but intervene and supervise when necessary  - Involve family in performance of ADLs  - Assess for home care needs following discharge   - Consider OT consult to assist with ADL evaluation and planning for discharge  - Provide patient education as appropriate  Outcome: Adequate for Discharge  Goal:  Maintains/Returns to pre admission functional level  Description: INTERVENTIONS:  - Perform AM-PAC 6 Click Basic Mobility/ Daily Activity assessment daily.  - Set and communicate daily mobility goal to care team and patient/family/caregiver.   - Collaborate with rehabilitation services on mobility goals if consulted    - Out of bed for toileting  - Record patient progress and toleration of activity level   Outcome: Adequate for Discharge     Problem: Knowledge Deficit  Goal: Patient/family/caregiver demonstrates understanding of disease process, treatment plan, medications, and discharge instructions  Description: Complete learning assessment and assess knowledge base.  Interventions:  - Provide teaching at level of understanding  - Provide teaching via preferred learning methods  Outcome: Adequate for Discharge     Problem: DISCHARGE PLANNING  Goal: Discharge to home or other facility with appropriate resources  Description: INTERVENTIONS:  - Identify barriers to discharge w/patient and caregiver  - Arrange for needed discharge resources and transportation as appropriate  - Identify discharge learning needs (meds, wound care, etc.)  - Arrange for interpretive services to assist at discharge as needed  - Refer to Case Management Department for coordinating discharge planning if the patient needs post-hospital services based on physician/advanced practitioner order or complex needs related to functional status, cognitive ability, or social support system  Outcome: Adequate for Discharge

## 2024-11-07 NOTE — ASSESSMENT & PLAN NOTE
A/P.  38 y.o.  POD#3 s/p , Low Transverse at 39w5d of 3850 g (8 lb 7.8 oz) male , apgars 8 /9 .  (1) POD#3.  Delivered 2024  8:07 AM.  Doing well.  --> Routine postoperative care.  - ok for discharge home today

## 2024-11-11 ENCOUNTER — TELEPHONE (OUTPATIENT)
Dept: OBGYN CLINIC | Facility: CLINIC | Age: 39
End: 2024-11-11

## 2024-11-15 ENCOUNTER — POSTPARTUM VISIT (OUTPATIENT)
Dept: OBGYN CLINIC | Facility: CLINIC | Age: 39
End: 2024-11-15

## 2024-11-15 VITALS
HEIGHT: 64 IN | WEIGHT: 213 LBS | SYSTOLIC BLOOD PRESSURE: 120 MMHG | DIASTOLIC BLOOD PRESSURE: 80 MMHG | BODY MASS INDEX: 36.37 KG/M2

## 2024-11-15 PROCEDURE — 99024 POSTOP FOLLOW-UP VISIT: CPT | Performed by: OBSTETRICS & GYNECOLOGY

## 2024-11-15 NOTE — PROGRESS NOTES
"[facility] - [address], [address], [place] [address]    Assessment/Plan:  Assessment & Plan  Encounter for postpartum visit  Cont postop restrictions  RTO for 3 week PP visit  Cont monitoring Bps  Cont nifedipine         Subjective:   Manju Khalil is a 38 y.o.  .  CC:   Chief Complaint   Patient presents with    Postpartum Care     PP 10 days, csection, reji Santo, \"Hiren, breastfeeding       HPI: 39yo  presents for postop visit s/p RLTCS for incision and BP check. She reports no complaints. Pain is controlled. Bleeding is minimal. Denies depression. Reports blood pressures at home are 120s-130s/80s.    ROS: Negative except as noted in HPI    Patient's last menstrual period was 2024.       She  reports being sexually active and has had partner(s) who are male.       The following portions of the patient's history were reviewed and updated as appropriate:   Past Medical History:   Diagnosis Date    Anxiety     Follows Dr. Barrera -currently on Zoloft and Lamictal    Carpal tunnel syndrome     GERD (gastroesophageal reflux disease)     Irritable bowel syndrome (IBS)     Migraines     with Aura    PTSD (post-traumatic stress disorder)     Follows Dr. Barrera ([facility] psychiatry) currently on Zoloft and Lamictal     Past Surgical History:   Procedure Laterality Date     SECTION      SINUS SURGERY      TONSILLECTOMY       Family History   Problem Relation Age of Onset    Psychiatric Illness Mother     Depression Mother     Breast cancer Mother 55    Drug abuse Father     Alcohol abuse Father     Diabetes Father         Type 1    Hypertension Father      Social History     Socioeconomic History    Marital status: Unknown     Spouse name: Not on file    Number of children: Not on file    Years of education: Not on file    Highest education level: Not on file   Occupational History    Not on file   Tobacco Use    Smoking status: Never    Smokeless tobacco: " Never   Vaping Use    Vaping status: Never Used   Substance and Sexual Activity    Alcohol use: Not Currently     Comment: social    Drug use: Never    Sexual activity: Yes     Partners: Male   Other Topics Concern    Not on file   Social History Narrative    Not on file     Social Drivers of Health     Financial Resource Strain: Not on file   Food Insecurity: No Food Insecurity (2024)    Nursing - Inadequate Food Risk Classification     Worried About Running Out of Food in the Last Year: Never true     Ran Out of Food in the Last Year: Never true     Ran Out of Food in the Last Year: 1   Transportation Needs: No Transportation Needs (2024)    Nursing - Transportation Risk Classification     Lack of Transportation: Not on file     Lack of Transportation: 2   Physical Activity: Not on file   Stress: Not on file   Social Connections: Not on file   Intimate Partner Violence: Unknown (2024)    Nursing IPS     Feels Physically and Emotionally Safe: Not on file     Physically Hurt by Someone: Not on file     Humiliated or Emotionally Abused by Someone: Not on file     Physically Hurt by Someone: 2     Hurt or Threatened by Someone: 2   Housing Stability: Unknown (2024)    Nursing: Inadequate Housing Risk Classification     Has Housing: Not on file     Worried About Losing Housing: Not on file     Unable to Get Utilities: Not on file     Unable to Pay for Housing in the Last Year: 2     Has Housin     Outpatient Medications Marked as Taking for the 11/15/24 encounter (Postpartum Visit) with Sheila Newman V DO   Medication    ibuprofen (MOTRIN) 600 mg tablet    lamoTRIgine (LaMICtal) 100 mg tablet    NIFEdipine (PROCARDIA XL) 30 mg 24 hr tablet    Prenatal MV-Min-Fe Fum-FA-DHA (PRENATAL MULTIVITAMIN PLUS DHA PO)    sertraline (ZOLOFT) 100 mg tablet     Allergies   Allergen Reactions    Dust Mite Extract Hives    Pollen Extract-Tree Extract [Pollen Extract] Nasal Congestion           Objective:  BP  "120/80 (BP Location: Left arm, Patient Position: Sitting, Cuff Size: Large)   Ht 5' 4\" (1.626 m)   Wt 96.6 kg (213 lb)   LMP 01/31/2024   Breastfeeding Yes   BMI 36.56 kg/m²          General Appearance: alert and oriented, in no acute distress.   Abdomen: Soft, non-tender, non-distended, no masses, no rebound or guarding. Incision c/d/i  Extremities: Normal range of motion.   Skin: normal, no rash or abnormalities  Neurologic: alert, oriented x3  Psychiatric: Appropriate affect, mood stable, cooperative with exam.        Sheila Newman, DO  11/15/2024 8:54 AM   "

## 2024-12-10 ENCOUNTER — TELEPHONE (OUTPATIENT)
Dept: PSYCHIATRY | Facility: CLINIC | Age: 39
End: 2024-12-10

## 2024-12-10 NOTE — TELEPHONE ENCOUNTER
Left a voicemail. We do not accept Medicaid and forms need to be signed tomorrow. In order to have virtual with Corrie Adler, we need to have forms completed.

## 2025-01-21 ENCOUNTER — DOCUMENTATION (OUTPATIENT)
Dept: BEHAVIORAL/MENTAL HEALTH CLINIC | Facility: CLINIC | Age: 40
End: 2025-01-21

## 2025-01-21 DIAGNOSIS — F43.10 PTSD (POST-TRAUMATIC STRESS DISORDER): Primary | ICD-10-CM

## 2025-01-21 DIAGNOSIS — F42.2 MIXED OBSESSIONAL THOUGHTS AND ACTS: ICD-10-CM

## 2025-01-21 NOTE — PROGRESS NOTES
Psychotherapy Discharge Summary    Preferred Name: Manju Khalil  YOB: 1985    Admission date to psychotherapy: 7/17/23    Referred by: Psychiatry    Presenting Problem: PTSD    Course of treatment included : individual therapy     Progress/Outcome of Treatment Goals (brief summary of course of treatment) Client was able to demonstrate a reduction in anxiety through treatment.    Treatment Complications (if any): Attendance.     Treatment Progress: fair    Current [facility] Psychiatric Provider: Corrie Adler    Discharge Medications include: Lamictal, Zoloft    Discharge Date: 1/21/25    Discharge Diagnosis:   1. PTSD (post-traumatic stress disorder)        2. Mixed obsessional thoughts and acts            Criteria for Discharge:  Has not been seen in more than 3 months.      Aftercare recommendations include (include specific referral names and phone numbers, if appropriate): Return to therapy should she identify treatment as necessary.  Therapist will resume treatment with this client should she desire to return to this provider.     Prognosis: good

## 2025-02-12 DIAGNOSIS — F43.10 PTSD (POST-TRAUMATIC STRESS DISORDER): ICD-10-CM

## 2025-02-12 NOTE — TELEPHONE ENCOUNTER
Reason for call:   [x] Refill   [] Prior Auth  [] Other:     Office:   [] PCP/Provider -   [x] Specialty/Provider - psych wilfrido moore    Medication:     sertraline (ZOLOFT) 100 mg table   90    lamoTRIgine (LaMICtal) 100 mg tablet   90      Pharmacy: [facility]/pharmacy #[id number] - [place] - [phone] [phone]     Does the patient have enough for 3 days?   [] Yes   [x] No - Send as HP to POD

## 2025-02-13 RX ORDER — LAMOTRIGINE 100 MG/1
100 TABLET ORAL DAILY
Qty: 90 TABLET | Refills: 0 | OUTPATIENT
Start: 2025-02-13 | End: 2025-05-14

## 2025-02-13 RX ORDER — SERTRALINE HYDROCHLORIDE 100 MG/1
100 TABLET, FILM COATED ORAL DAILY
Qty: 90 TABLET | Refills: 0 | OUTPATIENT
Start: 2025-02-13 | End: 2025-05-14

## 2025-02-14 ENCOUNTER — TELEPHONE (OUTPATIENT)
Age: 40
End: 2025-02-14

## 2025-02-14 NOTE — TELEPHONE ENCOUNTER
Patient called to schedule a follow up appointment with the provider, with KRISTA Betts.    Writer successfully transferred the call to  to assist with scheduling.     Patient is also looking to schedule an appointment with NAYANA Adler as well.    Writer was successfully transferred to the PF to assist further, with provider scheduling.

## 2025-02-17 ENCOUNTER — TELEMEDICINE (OUTPATIENT)
Dept: OTHER | Facility: HOSPITAL | Age: 40
End: 2025-02-17
Payer: MEDICARE

## 2025-02-17 DIAGNOSIS — F41.1 GENERALIZED ANXIETY DISORDER: ICD-10-CM

## 2025-02-17 DIAGNOSIS — F43.10 PTSD (POST-TRAUMATIC STRESS DISORDER): ICD-10-CM

## 2025-02-17 DIAGNOSIS — F42.2 MIXED OBSESSIONAL THOUGHTS AND ACTS: ICD-10-CM

## 2025-02-17 PROCEDURE — 99213 OFFICE O/P EST LOW 20 MIN: CPT | Performed by: NURSE PRACTITIONER

## 2025-02-17 RX ORDER — SERTRALINE HYDROCHLORIDE 100 MG/1
100 TABLET, FILM COATED ORAL DAILY
Qty: 14 TABLET | Refills: 0 | Status: SHIPPED | OUTPATIENT
Start: 2025-02-17 | End: 2025-03-03

## 2025-02-17 RX ORDER — LAMOTRIGINE 100 MG/1
100 TABLET ORAL DAILY
Qty: 14 TABLET | Refills: 0 | Status: SHIPPED | OUTPATIENT
Start: 2025-02-17 | End: 2025-03-03

## 2025-02-17 NOTE — PROGRESS NOTES
Virtual Regular Visit  Name: Manju Khalil      : 1985      MRN: [id number]  Encounter Provider: Your Video Visit Provider  Encounter Date: 2025   Encounter department: VIRTUAL CARE       Verification of patient location:  Patient is located at Home in the following state in which I hold an active license [place] :  Assessment & Plan  Generalized anxiety disorder    Orders:    lamoTRIgine (LaMICtal) 100 mg tablet; Take 1 tablet (100 mg total) by mouth daily for 14 days    Mixed obsessional thoughts and acts    Orders:    lamoTRIgine (LaMICtal) 100 mg tablet; Take 1 tablet (100 mg total) by mouth daily for 14 days    PTSD (post-traumatic stress disorder)    Orders:    sertraline (ZOLOFT) 100 mg tablet; Take 1 tablet (100 mg total) by mouth daily for 14 days    lamoTRIgine (LaMICtal) 100 mg tablet; Take 1 tablet (100 mg total) by mouth daily for 14 days        Encounter provider Your Video Visit Provider    The patient was identified by name and date of birth. Manju Khalil was informed that this is a telemedicine visit and that the visit is being conducted through the Epic Embedded platform. She agrees to proceed..  My office door was closed. No one else was in the room.  She acknowledged consent and understanding of privacy and security of the video platform. The patient has agreed to participate and understands they can discontinue the visit at any time.    Patient is aware this is a billable service.     History obtained from: patient  History of Present Illness     This maria e  39 year old female here today.  She states she sees her psychiatrist at the end of month.  She needs a script for her Zofran and Lamictal until she has follow up.  She denies any symptoms at this time.       Review of Systems   Constitutional: Negative.    Respiratory: Negative.     Neurological: Negative.    Psychiatric/Behavioral: Negative.         Objective   There were no vitals taken for this  visit.    Physical Exam  Constitutional:       General: She is not in acute distress.     Appearance: Normal appearance. She is not ill-appearing or toxic-appearing.   HENT:      Head: Normocephalic.   Pulmonary:      Effort: Pulmonary effort is normal. No respiratory distress.   Neurological:      Mental Status: She is alert and oriented to person, place, and time.   Psychiatric:         Mood and Affect: Mood normal.         Behavior: Behavior normal.         Thought Content: Thought content normal.         Judgment: Judgment normal.         Visit Time  Total Visit Duration: 5 minutes not including the time spent for establishing the audio/video connection.

## 2025-02-17 NOTE — ASSESSMENT & PLAN NOTE
Orders:    lamoTRIgine (LaMICtal) 100 mg tablet; Take 1 tablet (100 mg total) by mouth daily for 14 days

## 2025-02-17 NOTE — ASSESSMENT & PLAN NOTE
Orders:    sertraline (ZOLOFT) 100 mg tablet; Take 1 tablet (100 mg total) by mouth daily for 14 days    lamoTRIgine (LaMICtal) 100 mg tablet; Take 1 tablet (100 mg total) by mouth daily for 14 days

## 2025-02-26 ENCOUNTER — TELEMEDICINE (OUTPATIENT)
Dept: PSYCHIATRY | Facility: CLINIC | Age: 40
End: 2025-02-26
Payer: COMMERCIAL

## 2025-02-26 ENCOUNTER — TELEPHONE (OUTPATIENT)
Dept: PSYCHIATRY | Facility: CLINIC | Age: 40
End: 2025-02-26

## 2025-02-26 DIAGNOSIS — F42.2 MIXED OBSESSIONAL THOUGHTS AND ACTS: ICD-10-CM

## 2025-02-26 DIAGNOSIS — F43.10 PTSD (POST-TRAUMATIC STRESS DISORDER): Primary | ICD-10-CM

## 2025-02-26 DIAGNOSIS — F41.1 GENERALIZED ANXIETY DISORDER: ICD-10-CM

## 2025-02-26 PROCEDURE — 99214 OFFICE O/P EST MOD 30 MIN: CPT | Performed by: STUDENT IN AN ORGANIZED HEALTH CARE EDUCATION/TRAINING PROGRAM

## 2025-02-26 PROCEDURE — 90833 PSYTX W PT W E/M 30 MIN: CPT | Performed by: STUDENT IN AN ORGANIZED HEALTH CARE EDUCATION/TRAINING PROGRAM

## 2025-02-26 RX ORDER — LAMOTRIGINE 100 MG/1
100 TABLET ORAL DAILY
Qty: 90 TABLET | Refills: 1 | Status: SHIPPED | OUTPATIENT
Start: 2025-02-26 | End: 2025-05-27

## 2025-02-26 RX ORDER — SERTRALINE HYDROCHLORIDE 100 MG/1
100 TABLET, FILM COATED ORAL
Qty: 90 TABLET | Refills: 1 | Status: SHIPPED | OUTPATIENT
Start: 2025-02-26

## 2025-02-26 NOTE — ASSESSMENT & PLAN NOTE
Orders:    lamoTRIgine (LaMICtal) 100 mg tablet; Take 1 tablet (100 mg total) by mouth daily    sertraline (ZOLOFT) 100 mg tablet; Take 1 tablet (100 mg total) by mouth daily at bedtime Total dose per day: 150mg.    sertraline (Zoloft) 50 mg tablet; Take 1 tablet (50 mg total) by mouth daily at bedtime Total dose per day: 150mg.

## 2025-02-26 NOTE — BH TREATMENT PLAN
TREATMENT PLAN (Medication Management Only)        [facility]      Name and Date of Birth:  Manju Khalil 39 y.o. 1985 MRN: [id number]    Date of Visit: February 26, 2025    Diagnosis/Diagnoses:    1. PTSD (post-traumatic stress disorder)  lamoTRIgine (LaMICtal) 100 mg tablet    sertraline (ZOLOFT) 100 mg tablet    sertraline (Zoloft) 50 mg tablet      2. Mixed obsessional thoughts and acts  sertraline (ZOLOFT) 100 mg tablet    sertraline (Zoloft) 50 mg tablet      3. Generalized anxiety disorder  sertraline (ZOLOFT) 100 mg tablet    sertraline (Zoloft) 50 mg tablet          Strengths/Personal Resources for Self-Care: supportive family, supportive friends, taking medications as prescribed, ability to adapt to life changes, ability to communicate needs, ability to communicate well, ability to listen, ability to reason, ability to understand psychiatric illness, average or above intelligence, general fund of knowledge, good physical health, good understanding of illness, independence, motivation for treatment, ability to negotiate basic needs, being resoureceful.    Area/Areas of need (in own words): anxiety symptoms    1. Long Term Goal: improve control of anxiety.  Target Date:6 months - 8/26/2025  Person/Persons responsible for completion of goal: Manju    2.  Short Term Objective (s) - How will we reach this goal?:   A. Provider new recommended medication/dosage changes and/or continue medication(s): continue current medications as prescribed.  B. Keep regularly scheduled psychiatric appointments  C. Maintain adherence to psychotropic medication regimen   D. Continue to engage in introspection  E. Maintain appropriate dietary intake  F. Practice adequate sleep hygiene    G. Finalize divorce, potentially sell house    Target Date:6 months - 8/26/2025  Person/Persons Responsible for Completion of Goal: Manju    Progress Towards Goals: continuing  treatment    Treatment Modality: medication management every 3 months    Review due 180 days from date of this plan: 6 months - 8/26/2025  Expected length of service: ongoing treatment    My Physician/PA/NP and I have developed this plan together and I agree to work on the goals and objectives. I understand the treatment goals that were developed for my treatment. Treatment Plan was completed with Manju's assistance and input throughout today's session. Manju consented to the information provided and documented above. Unfortunately, treatment plan was not physically signed at the time of this office visit secondary to time constraints and issues related to signature keypad. Again, Manju did verbally consent.

## 2025-02-26 NOTE — TELEPHONE ENCOUNTER
Called pt and left VM regarding scheduling 3mo f/u with provider.    Gave [facility] phone number to call back and schedule with Dr. Barrera.    If pt calls, please offer one of the following appt slots:    Any open time slot on 5/28/25

## 2025-02-26 NOTE — ASSESSMENT & PLAN NOTE
Orders:    sertraline (ZOLOFT) 100 mg tablet; Take 1 tablet (100 mg total) by mouth daily at bedtime Total dose per day: 150mg.    sertraline (Zoloft) 50 mg tablet; Take 1 tablet (50 mg total) by mouth daily at bedtime Total dose per day: 150mg.
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
## 2025-03-03 ENCOUNTER — TELEPHONE (OUTPATIENT)
Dept: PSYCHIATRY | Facility: CLINIC | Age: 40
End: 2025-03-03

## 2025-03-13 ENCOUNTER — TELEPHONE (OUTPATIENT)
Dept: PSYCHIATRY | Facility: CLINIC | Age: 40
End: 2025-03-13

## 2025-03-13 NOTE — TELEPHONE ENCOUNTER
Called pt and left VM regarding scheduling a 3mo f/u appt with Dr. Barrera.    Gave [facility] phone number to call back and schedule with provider at the [place] location.    If pt calls, please offer one of the following appt slots:     Any open time slot on 5/28/25

## 2025-03-17 ENCOUNTER — TELEPHONE (OUTPATIENT)
Dept: PSYCHIATRY | Facility: CLINIC | Age: 40
End: 2025-03-17

## 2025-03-17 NOTE — TELEPHONE ENCOUNTER
Called pt and left VM regarding scheduling a 3mo f/u appt with Dr. Barrera.     Gave [facility] phone number to call back and schedule with provider at the [place] location.     If pt calls, please offer one of the following appt slots:     Any open time slot on 5/28/25

## 2025-04-30 ENCOUNTER — TELEPHONE (OUTPATIENT)
Age: 40
End: 2025-04-30

## 2025-04-30 NOTE — TELEPHONE ENCOUNTER
Patient contacted the office to schedule a follow up visit with provider. Patient is now scheduled for 5/21/25  at 8a virtually.

## 2025-04-30 NOTE — TELEPHONE ENCOUNTER
Patient has been added to the Talk Therapy wait list without a referral.    Insurance: [employer]  Insurance Type:    Commercial []   Medicaid []   [facility] (if applicable)   Medicare [x]  Location Preference: none  Provider Preference: female  Virtual: Yes [x] No []  Were outside resources sent: Yes [] No [x]

## 2025-04-30 NOTE — TELEPHONE ENCOUNTER
Patient calling in requesting a letter to show she is under psychiatric care.    Please review and contact patient with any questions @     [phone] (Mobile)

## 2025-04-30 NOTE — LETTER
[facility]  [address]  [place] [address]  Phone#  [phone]  Fax#  [phone]    25       Manju Khalil   [address] [address]       To Whom It May Concern:       Manju Khalil (: 1985) is a patient of mine at [facility]. Manju has been under my care since 2023, for a mental disability found in the DSM-5 (Diagnostic and Statistical Manual of Mental Disorders). I am a Psychiatrist in the [facility]. I am familiar with Manju's history and related mental health symptoms.    Manju is currently receiving outpatient psychiatric treatment for the following Behavioral Health Diagnosis:  Patient Active Problem List   Diagnosis    Generalized anxiety disorder    PTSD (post-traumatic stress disorder)    OCD (obsessive compulsive disorder)         If any additional documentation is required, please let me know.         Sincerely,        Juan Miguel Barrera MD  Board Certified Diplomate of the American Board of Psychiatry and Neurology  [facility]

## 2025-05-01 NOTE — TELEPHONE ENCOUNTER
Please allow at least 7-10 business days for referrals to be processed.    Pt added to CM work queue. Please send a message to our CM Pool at [phone] if Pt contacts office in regards to a referral that is being processed.

## 2025-05-02 NOTE — TELEPHONE ENCOUNTER
Outreached patient via phone at [phone]. The patient was not available and a voicemail was left with callback instructions for this writer at [phone].    Intend to ask patient what content they are seeking for letter, such as diagnosis/diagnoses, and to discuss need for PRATIBHA.

## 2025-05-20 ENCOUNTER — TELEPHONE (OUTPATIENT)
Age: 40
End: 2025-05-20

## 2025-05-20 NOTE — TELEPHONE ENCOUNTER
Patient is calling regarding cancelling an appointment.    Date/Time: 5/21 @8am    Reason: ins issue    Patient was rescheduled: YES [] NO [x]  If yes, when was Patient reschedule for: patient will call back to r/s     Patient requesting call back to reschedule: YES [] NO [x]    Provider has been notified.

## 2025-08-12 ENCOUNTER — TELEPHONE (OUTPATIENT)
Age: 40
End: 2025-08-12

## 2025-08-20 ENCOUNTER — TELEPHONE (OUTPATIENT)
Age: 40
End: 2025-08-20

## 2025-08-20 ENCOUNTER — TELEPHONE (OUTPATIENT)
Dept: PSYCHIATRY | Facility: CLINIC | Age: 40
End: 2025-08-20

## 2025-08-20 ENCOUNTER — TELEMEDICINE (OUTPATIENT)
Dept: PSYCHIATRY | Facility: CLINIC | Age: 40
End: 2025-08-20
Payer: COMMERCIAL

## 2025-08-20 DIAGNOSIS — F42.2 MIXED OBSESSIONAL THOUGHTS AND ACTS: ICD-10-CM

## 2025-08-20 DIAGNOSIS — F43.10 PTSD (POST-TRAUMATIC STRESS DISORDER): ICD-10-CM

## 2025-08-20 DIAGNOSIS — F41.1 GENERALIZED ANXIETY DISORDER: ICD-10-CM

## 2025-08-20 PROCEDURE — 99213 OFFICE O/P EST LOW 20 MIN: CPT | Performed by: STUDENT IN AN ORGANIZED HEALTH CARE EDUCATION/TRAINING PROGRAM

## 2025-08-20 RX ORDER — SERTRALINE HYDROCHLORIDE 100 MG/1
100 TABLET, FILM COATED ORAL
Qty: 90 TABLET | Refills: 2 | Status: SHIPPED | OUTPATIENT
Start: 2025-08-20

## 2025-08-20 RX ORDER — LAMOTRIGINE 100 MG/1
100 TABLET ORAL DAILY
Qty: 90 TABLET | Refills: 2 | Status: SHIPPED | OUTPATIENT
Start: 2025-08-20